# Patient Record
Sex: MALE | Race: BLACK OR AFRICAN AMERICAN | NOT HISPANIC OR LATINO | URBAN - METROPOLITAN AREA
[De-identification: names, ages, dates, MRNs, and addresses within clinical notes are randomized per-mention and may not be internally consistent; named-entity substitution may affect disease eponyms.]

---

## 2017-07-06 ENCOUNTER — OUTPATIENT (OUTPATIENT)
Dept: OUTPATIENT SERVICES | Facility: HOSPITAL | Age: 62
LOS: 1 days | Discharge: HOME | End: 2017-07-06
Payer: COMMERCIAL

## 2017-07-06 DIAGNOSIS — E66.01 MORBID (SEVERE) OBESITY DUE TO EXCESS CALORIES: ICD-10-CM

## 2017-07-06 LAB
ABO RH CONFIRMATION: SIGNIFICANT CHANGE UP
ALBUMIN SERPL ELPH-MCNC: 4.3 G/DL — SIGNIFICANT CHANGE UP (ref 3.3–5)
ANION GAP SERPL CALC-SCNC: 11 MMOL/L — SIGNIFICANT CHANGE UP (ref 5–17)
APPEARANCE UR: (no result)
APTT BLD: 30.3 SEC — SIGNIFICANT CHANGE UP (ref 27.5–37.4)
BACTERIA # UR AUTO: (no result)
BASOPHILS # BLD AUTO: 0.1 K/UL — SIGNIFICANT CHANGE UP (ref 0–0.2)
BASOPHILS NFR BLD AUTO: 2.2 % — HIGH (ref 0–2)
BILIRUB UR-MCNC: NEGATIVE — SIGNIFICANT CHANGE UP
BLD GP AB SCN SERPL QL: SIGNIFICANT CHANGE UP
BLOOD GAS SOURCE: SIGNIFICANT CHANGE UP
BUN SERPL-MCNC: 26 MG/DL — HIGH (ref 7–23)
CALCIUM SERPL-MCNC: 9.7 MG/DL — SIGNIFICANT CHANGE UP (ref 8.5–10.1)
CHLORIDE SERPL-SCNC: 104 MMOL/L — SIGNIFICANT CHANGE UP (ref 96–108)
CO2 SERPL-SCNC: 24 MMOL/L — SIGNIFICANT CHANGE UP (ref 22–31)
COHGB MFR BLDV: 1.6 % — HIGH (ref 0–1.5)
COLOR SPEC: YELLOW — SIGNIFICANT CHANGE UP
CREAT SERPL-MCNC: 2.12 MG/DL — HIGH (ref 0.5–1.3)
DIFF PNL FLD: (no result)
EOSINOPHIL # BLD AUTO: 0.2 K/UL — SIGNIFICANT CHANGE UP (ref 0–0.5)
EOSINOPHIL NFR BLD AUTO: 3.6 % — SIGNIFICANT CHANGE UP (ref 0–6)
EPI CELLS # UR: (no result)
GLUCOSE SERPL-MCNC: 135 MG/DL — HIGH (ref 70–99)
GLUCOSE UR QL: NEGATIVE MG/DL — SIGNIFICANT CHANGE UP
HCT VFR BLD CALC: 43.6 % — SIGNIFICANT CHANGE UP (ref 39–50)
HGB BLD-MCNC: 14.4 G/DL — SIGNIFICANT CHANGE UP (ref 13–17)
HYALINE CASTS # UR AUTO: (no result) /LPF
INR BLD: 1.16 RATIO — SIGNIFICANT CHANGE UP (ref 0.88–1.16)
KETONES UR-MCNC: (no result)
LEUKOCYTE ESTERASE UR-ACNC: (no result)
LYMPHOCYTES # BLD AUTO: 2.6 K/UL — SIGNIFICANT CHANGE UP (ref 1–3.3)
LYMPHOCYTES # BLD AUTO: 44.2 % — HIGH (ref 13–44)
MCHC RBC-ENTMCNC: 26.6 PG — LOW (ref 27–34)
MCHC RBC-ENTMCNC: 33.1 GM/DL — SIGNIFICANT CHANGE UP (ref 32–36)
MCV RBC AUTO: 80.2 FL — SIGNIFICANT CHANGE UP (ref 80–100)
MONOCYTES # BLD AUTO: 0.6 K/UL — SIGNIFICANT CHANGE UP (ref 0–0.9)
MONOCYTES NFR BLD AUTO: 10.1 % — SIGNIFICANT CHANGE UP (ref 2–14)
NEUTROPHILS # BLD AUTO: 2.4 K/UL — SIGNIFICANT CHANGE UP (ref 1.8–7.4)
NEUTROPHILS NFR BLD AUTO: 39.9 % — LOW (ref 43–77)
NITRITE UR-MCNC: NEGATIVE — SIGNIFICANT CHANGE UP
PH UR: 5 — SIGNIFICANT CHANGE UP (ref 5–8)
PLATELET # BLD AUTO: 329 K/UL — SIGNIFICANT CHANGE UP (ref 150–400)
POTASSIUM SERPL-MCNC: 3.7 MMOL/L — SIGNIFICANT CHANGE UP (ref 3.5–5.3)
POTASSIUM SERPL-SCNC: 3.7 MMOL/L — SIGNIFICANT CHANGE UP (ref 3.5–5.3)
PROT UR-MCNC: 30 MG/DL
PROTHROM AB SERPL-ACNC: 12.6 SEC — SIGNIFICANT CHANGE UP (ref 9.8–12.7)
RBC # BLD: 5.44 M/UL — SIGNIFICANT CHANGE UP (ref 4.2–5.8)
RBC # FLD: 13 % — SIGNIFICANT CHANGE UP (ref 10.3–14.5)
RBC CASTS # UR COMP ASSIST: (no result) /HPF (ref 0–4)
SODIUM SERPL-SCNC: 139 MMOL/L — SIGNIFICANT CHANGE UP (ref 135–145)
SP GR SPEC: 1.02 — SIGNIFICANT CHANGE UP (ref 1.01–1.02)
TYPE + AB SCN PNL BLD: SIGNIFICANT CHANGE UP
UROBILINOGEN FLD QL: 1 MG/DL
WBC # BLD: 6 K/UL — SIGNIFICANT CHANGE UP (ref 3.8–10.5)
WBC # FLD AUTO: 6 K/UL — SIGNIFICANT CHANGE UP (ref 3.8–10.5)
WBC UR QL: SIGNIFICANT CHANGE UP

## 2017-07-06 PROCEDURE — 93010 ELECTROCARDIOGRAM REPORT: CPT

## 2017-07-11 RX ORDER — HYDROMORPHONE HYDROCHLORIDE 2 MG/ML
30 INJECTION INTRAMUSCULAR; INTRAVENOUS; SUBCUTANEOUS
Qty: 0 | Refills: 0 | Status: DISCONTINUED | OUTPATIENT
Start: 2017-07-12 | End: 2017-07-13

## 2017-07-11 RX ORDER — HEPARIN SODIUM 5000 [USP'U]/ML
5000 INJECTION INTRAVENOUS; SUBCUTANEOUS ONCE
Qty: 0 | Refills: 0 | Status: COMPLETED | OUTPATIENT
Start: 2017-07-12 | End: 2017-07-12

## 2017-07-11 RX ORDER — ONDANSETRON 8 MG/1
4 TABLET, FILM COATED ORAL EVERY 6 HOURS
Qty: 0 | Refills: 0 | Status: DISCONTINUED | OUTPATIENT
Start: 2017-07-12 | End: 2017-07-13

## 2017-07-11 RX ORDER — OXYCODONE HYDROCHLORIDE 5 MG/1
10 TABLET ORAL ONCE
Qty: 0 | Refills: 0 | Status: DISCONTINUED | OUTPATIENT
Start: 2017-07-12 | End: 2017-07-12

## 2017-07-11 RX ORDER — NALOXONE HYDROCHLORIDE 4 MG/.1ML
0.1 SPRAY NASAL
Qty: 0 | Refills: 0 | Status: DISCONTINUED | OUTPATIENT
Start: 2017-07-12 | End: 2017-07-13

## 2017-07-11 RX ORDER — HYDROMORPHONE HYDROCHLORIDE 2 MG/ML
0.5 INJECTION INTRAMUSCULAR; INTRAVENOUS; SUBCUTANEOUS
Qty: 0 | Refills: 0 | Status: DISCONTINUED | OUTPATIENT
Start: 2017-07-12 | End: 2017-07-13

## 2017-07-11 RX ORDER — SODIUM CHLORIDE 9 MG/ML
1000 INJECTION, SOLUTION INTRAVENOUS
Qty: 0 | Refills: 0 | Status: DISCONTINUED | OUTPATIENT
Start: 2017-07-12 | End: 2017-07-12

## 2017-07-11 RX ORDER — APREPITANT 80 MG/1
40 CAPSULE ORAL ONCE
Qty: 0 | Refills: 0 | Status: COMPLETED | OUTPATIENT
Start: 2017-07-12 | End: 2017-07-12

## 2017-07-12 ENCOUNTER — INPATIENT (INPATIENT)
Facility: HOSPITAL | Age: 62
LOS: 1 days | Discharge: HOME | End: 2017-07-14
Attending: SURGERY | Admitting: SURGERY
Payer: COMMERCIAL

## 2017-07-12 VITALS
HEART RATE: 73 BPM | OXYGEN SATURATION: 100 % | HEIGHT: 73 IN | SYSTOLIC BLOOD PRESSURE: 129 MMHG | RESPIRATION RATE: 16 BRPM | DIASTOLIC BLOOD PRESSURE: 87 MMHG | TEMPERATURE: 98 F | WEIGHT: 238.1 LBS

## 2017-07-12 DIAGNOSIS — M67.00 SHORT ACHILLES TENDON (ACQUIRED), UNSPECIFIED ANKLE: Chronic | ICD-10-CM

## 2017-07-12 LAB
ANION GAP SERPL CALC-SCNC: 13 MMOL/L — SIGNIFICANT CHANGE UP (ref 5–17)
BASOPHILS # BLD AUTO: 0.1 K/UL — SIGNIFICANT CHANGE UP (ref 0–0.2)
BASOPHILS NFR BLD AUTO: 0.5 % — SIGNIFICANT CHANGE UP (ref 0–2)
BUN SERPL-MCNC: 26 MG/DL — HIGH (ref 7–23)
CALCIUM SERPL-MCNC: 9.2 MG/DL — SIGNIFICANT CHANGE UP (ref 8.5–10.1)
CHLORIDE SERPL-SCNC: 105 MMOL/L — SIGNIFICANT CHANGE UP (ref 96–108)
CO2 SERPL-SCNC: 20 MMOL/L — LOW (ref 22–31)
CREAT SERPL-MCNC: 2.59 MG/DL — HIGH (ref 0.5–1.3)
EOSINOPHIL # BLD AUTO: 0 K/UL — SIGNIFICANT CHANGE UP (ref 0–0.5)
EOSINOPHIL NFR BLD AUTO: 0.4 % — SIGNIFICANT CHANGE UP (ref 0–6)
GLUCOSE SERPL-MCNC: 189 MG/DL — HIGH (ref 70–99)
HCT VFR BLD CALC: 40.9 % — SIGNIFICANT CHANGE UP (ref 39–50)
HGB BLD-MCNC: 13.7 G/DL — SIGNIFICANT CHANGE UP (ref 13–17)
LYMPHOCYTES # BLD AUTO: 1.8 K/UL — SIGNIFICANT CHANGE UP (ref 1–3.3)
LYMPHOCYTES # BLD AUTO: 14.9 % — SIGNIFICANT CHANGE UP (ref 13–44)
MCHC RBC-ENTMCNC: 26.7 PG — LOW (ref 27–34)
MCHC RBC-ENTMCNC: 33.5 GM/DL — SIGNIFICANT CHANGE UP (ref 32–36)
MCV RBC AUTO: 79.8 FL — LOW (ref 80–100)
MONOCYTES # BLD AUTO: 0.3 K/UL — SIGNIFICANT CHANGE UP (ref 0–0.9)
MONOCYTES NFR BLD AUTO: 2.5 % — SIGNIFICANT CHANGE UP (ref 2–14)
NEUTROPHILS # BLD AUTO: 9.6 K/UL — HIGH (ref 1.8–7.4)
NEUTROPHILS NFR BLD AUTO: 81.7 % — HIGH (ref 43–77)
PLATELET # BLD AUTO: 289 K/UL — SIGNIFICANT CHANGE UP (ref 150–400)
POTASSIUM SERPL-MCNC: 3.7 MMOL/L — SIGNIFICANT CHANGE UP (ref 3.5–5.3)
POTASSIUM SERPL-SCNC: 3.7 MMOL/L — SIGNIFICANT CHANGE UP (ref 3.5–5.3)
RBC # BLD: 5.13 M/UL — SIGNIFICANT CHANGE UP (ref 4.2–5.8)
RBC # FLD: 12.8 % — SIGNIFICANT CHANGE UP (ref 10.3–14.5)
SODIUM SERPL-SCNC: 138 MMOL/L — SIGNIFICANT CHANGE UP (ref 135–145)
WBC # BLD: 11.8 K/UL — HIGH (ref 3.8–10.5)
WBC # FLD AUTO: 11.8 K/UL — HIGH (ref 3.8–10.5)

## 2017-07-12 PROCEDURE — 88342 IMHCHEM/IMCYTCHM 1ST ANTB: CPT | Mod: 26

## 2017-07-12 PROCEDURE — 88304 TISSUE EXAM BY PATHOLOGIST: CPT | Mod: 26

## 2017-07-12 PROCEDURE — 88313 SPECIAL STAINS GROUP 2: CPT | Mod: 26

## 2017-07-12 PROCEDURE — 88307 TISSUE EXAM BY PATHOLOGIST: CPT | Mod: 26

## 2017-07-12 RX ORDER — HYDRALAZINE HCL 50 MG
10 TABLET ORAL ONCE
Qty: 0 | Refills: 0 | Status: COMPLETED | OUTPATIENT
Start: 2017-07-12 | End: 2017-07-12

## 2017-07-12 RX ORDER — METOPROLOL TARTRATE 50 MG
5 TABLET ORAL ONCE
Qty: 0 | Refills: 0 | Status: COMPLETED | OUTPATIENT
Start: 2017-07-12 | End: 2017-07-12

## 2017-07-12 RX ORDER — DEXTROSE 50 % IN WATER 50 %
25 SYRINGE (ML) INTRAVENOUS ONCE
Qty: 0 | Refills: 0 | Status: DISCONTINUED | OUTPATIENT
Start: 2017-07-12 | End: 2017-07-14

## 2017-07-12 RX ORDER — SODIUM CHLORIDE 9 MG/ML
1000 INJECTION, SOLUTION INTRAVENOUS
Qty: 0 | Refills: 0 | Status: DISCONTINUED | OUTPATIENT
Start: 2017-07-12 | End: 2017-07-14

## 2017-07-12 RX ORDER — DEXTROSE 50 % IN WATER 50 %
12.5 SYRINGE (ML) INTRAVENOUS ONCE
Qty: 0 | Refills: 0 | Status: DISCONTINUED | OUTPATIENT
Start: 2017-07-12 | End: 2017-07-14

## 2017-07-12 RX ORDER — ENOXAPARIN SODIUM 100 MG/ML
40 INJECTION SUBCUTANEOUS EVERY 12 HOURS
Qty: 0 | Refills: 0 | Status: DISCONTINUED | OUTPATIENT
Start: 2017-07-12 | End: 2017-07-14

## 2017-07-12 RX ORDER — PANTOPRAZOLE SODIUM 20 MG/1
40 TABLET, DELAYED RELEASE ORAL EVERY 24 HOURS
Qty: 0 | Refills: 0 | Status: DISCONTINUED | OUTPATIENT
Start: 2017-07-12 | End: 2017-07-14

## 2017-07-12 RX ORDER — INSULIN LISPRO 100/ML
VIAL (ML) SUBCUTANEOUS EVERY 6 HOURS
Qty: 0 | Refills: 0 | Status: DISCONTINUED | OUTPATIENT
Start: 2017-07-12 | End: 2017-07-14

## 2017-07-12 RX ORDER — GLUCAGON INJECTION, SOLUTION 0.5 MG/.1ML
1 INJECTION, SOLUTION SUBCUTANEOUS ONCE
Qty: 0 | Refills: 0 | Status: DISCONTINUED | OUTPATIENT
Start: 2017-07-12 | End: 2017-07-14

## 2017-07-12 RX ADMIN — Medication 10 MILLIGRAM(S): at 15:48

## 2017-07-12 RX ADMIN — OXYCODONE HYDROCHLORIDE 10 MILLIGRAM(S): 5 TABLET ORAL at 10:08

## 2017-07-12 RX ADMIN — Medication 10 MILLIGRAM(S): at 15:26

## 2017-07-12 RX ADMIN — Medication 5 MILLIGRAM(S): at 15:49

## 2017-07-12 RX ADMIN — ENOXAPARIN SODIUM 40 MILLIGRAM(S): 100 INJECTION SUBCUTANEOUS at 18:29

## 2017-07-12 RX ADMIN — Medication 1: at 18:31

## 2017-07-12 RX ADMIN — HYDROMORPHONE HYDROCHLORIDE 30 MILLILITER(S): 2 INJECTION INTRAMUSCULAR; INTRAVENOUS; SUBCUTANEOUS at 15:27

## 2017-07-12 RX ADMIN — HEPARIN SODIUM 5000 UNIT(S): 5000 INJECTION INTRAVENOUS; SUBCUTANEOUS at 10:08

## 2017-07-12 RX ADMIN — APREPITANT 40 MILLIGRAM(S): 80 CAPSULE ORAL at 10:07

## 2017-07-12 RX ADMIN — PANTOPRAZOLE SODIUM 40 MILLIGRAM(S): 20 TABLET, DELAYED RELEASE ORAL at 15:18

## 2017-07-12 RX ADMIN — Medication 1: at 23:32

## 2017-07-12 RX ADMIN — SODIUM CHLORIDE 150 MILLILITER(S): 9 INJECTION, SOLUTION INTRAVENOUS at 21:08

## 2017-07-12 RX ADMIN — SODIUM CHLORIDE 150 MILLILITER(S): 9 INJECTION, SOLUTION INTRAVENOUS at 15:17

## 2017-07-13 DIAGNOSIS — E66.01 MORBID (SEVERE) OBESITY DUE TO EXCESS CALORIES: ICD-10-CM

## 2017-07-13 LAB
ANION GAP SERPL CALC-SCNC: 14 MMOL/L — SIGNIFICANT CHANGE UP (ref 5–17)
BASOPHILS # BLD AUTO: 0 K/UL — SIGNIFICANT CHANGE UP (ref 0–0.2)
BASOPHILS NFR BLD AUTO: 0.4 % — SIGNIFICANT CHANGE UP (ref 0–2)
BUN SERPL-MCNC: 23 MG/DL — SIGNIFICANT CHANGE UP (ref 7–23)
CALCIUM SERPL-MCNC: 9.6 MG/DL — SIGNIFICANT CHANGE UP (ref 8.5–10.1)
CHLORIDE SERPL-SCNC: 103 MMOL/L — SIGNIFICANT CHANGE UP (ref 96–108)
CO2 SERPL-SCNC: 21 MMOL/L — LOW (ref 22–31)
CREAT SERPL-MCNC: 2 MG/DL — HIGH (ref 0.5–1.3)
EOSINOPHIL # BLD AUTO: 0 K/UL — SIGNIFICANT CHANGE UP (ref 0–0.5)
EOSINOPHIL NFR BLD AUTO: 0.1 % — SIGNIFICANT CHANGE UP (ref 0–6)
GLUCOSE SERPL-MCNC: 143 MG/DL — HIGH (ref 70–99)
HCT VFR BLD CALC: 42.4 % — SIGNIFICANT CHANGE UP (ref 39–50)
HGB BLD-MCNC: 14.1 G/DL — SIGNIFICANT CHANGE UP (ref 13–17)
LYMPHOCYTES # BLD AUTO: 1.3 K/UL — SIGNIFICANT CHANGE UP (ref 1–3.3)
LYMPHOCYTES # BLD AUTO: 14 % — SIGNIFICANT CHANGE UP (ref 13–44)
MCHC RBC-ENTMCNC: 26.7 PG — LOW (ref 27–34)
MCHC RBC-ENTMCNC: 33.2 GM/DL — SIGNIFICANT CHANGE UP (ref 32–36)
MCV RBC AUTO: 80.3 FL — SIGNIFICANT CHANGE UP (ref 80–100)
MONOCYTES # BLD AUTO: 0.5 K/UL — SIGNIFICANT CHANGE UP (ref 0–0.9)
MONOCYTES NFR BLD AUTO: 5.9 % — SIGNIFICANT CHANGE UP (ref 2–14)
NEUTROPHILS # BLD AUTO: 7.2 K/UL — SIGNIFICANT CHANGE UP (ref 1.8–7.4)
NEUTROPHILS NFR BLD AUTO: 79.5 % — HIGH (ref 43–77)
PLATELET # BLD AUTO: 308 K/UL — SIGNIFICANT CHANGE UP (ref 150–400)
POTASSIUM SERPL-MCNC: 3.8 MMOL/L — SIGNIFICANT CHANGE UP (ref 3.5–5.3)
POTASSIUM SERPL-SCNC: 3.8 MMOL/L — SIGNIFICANT CHANGE UP (ref 3.5–5.3)
RBC # BLD: 5.27 M/UL — SIGNIFICANT CHANGE UP (ref 4.2–5.8)
RBC # FLD: 13.4 % — SIGNIFICANT CHANGE UP (ref 10.3–14.5)
SODIUM SERPL-SCNC: 138 MMOL/L — SIGNIFICANT CHANGE UP (ref 135–145)
WBC # BLD: 9 K/UL — SIGNIFICANT CHANGE UP (ref 3.8–10.5)
WBC # FLD AUTO: 9 K/UL — SIGNIFICANT CHANGE UP (ref 3.8–10.5)

## 2017-07-13 PROCEDURE — 74241: CPT | Mod: 26

## 2017-07-13 RX ORDER — OXYCODONE HYDROCHLORIDE 5 MG/1
5 TABLET ORAL EVERY 4 HOURS
Qty: 0 | Refills: 0 | Status: DISCONTINUED | OUTPATIENT
Start: 2017-07-13 | End: 2017-07-14

## 2017-07-13 RX ORDER — KETOROLAC TROMETHAMINE 30 MG/ML
30 SYRINGE (ML) INJECTION EVERY 6 HOURS
Qty: 0 | Refills: 0 | Status: DISCONTINUED | OUTPATIENT
Start: 2017-07-13 | End: 2017-07-13

## 2017-07-13 RX ORDER — OXYCODONE HYDROCHLORIDE 5 MG/1
10 TABLET ORAL EVERY 4 HOURS
Qty: 0 | Refills: 0 | Status: DISCONTINUED | OUTPATIENT
Start: 2017-07-13 | End: 2017-07-14

## 2017-07-13 RX ADMIN — SODIUM CHLORIDE 150 MILLILITER(S): 9 INJECTION, SOLUTION INTRAVENOUS at 06:01

## 2017-07-13 RX ADMIN — Medication 30 MILLIGRAM(S): at 12:51

## 2017-07-13 RX ADMIN — ENOXAPARIN SODIUM 40 MILLIGRAM(S): 100 INJECTION SUBCUTANEOUS at 17:36

## 2017-07-13 RX ADMIN — Medication 2.5 MILLIGRAM(S): at 17:35

## 2017-07-13 RX ADMIN — Medication 2.5 MILLIGRAM(S): at 23:53

## 2017-07-13 RX ADMIN — Medication 2.5 MILLIGRAM(S): at 12:06

## 2017-07-13 RX ADMIN — PANTOPRAZOLE SODIUM 40 MILLIGRAM(S): 20 TABLET, DELAYED RELEASE ORAL at 17:35

## 2017-07-13 RX ADMIN — Medication 2.5 MILLIGRAM(S): at 00:50

## 2017-07-13 RX ADMIN — ENOXAPARIN SODIUM 40 MILLIGRAM(S): 100 INJECTION SUBCUTANEOUS at 05:57

## 2017-07-13 RX ADMIN — Medication 2.5 MILLIGRAM(S): at 05:57

## 2017-07-13 RX ADMIN — SODIUM CHLORIDE 150 MILLILITER(S): 9 INJECTION, SOLUTION INTRAVENOUS at 23:53

## 2017-07-13 RX ADMIN — Medication: at 12:06

## 2017-07-13 RX ADMIN — SODIUM CHLORIDE 150 MILLILITER(S): 9 INJECTION, SOLUTION INTRAVENOUS at 17:35

## 2017-07-13 NOTE — PROGRESS NOTE ADULT - SUBJECTIVE AND OBJECTIVE BOX
Post Op Day#: 1  Procedure:  Laparoscopic Sleeve Gastrectomy    The patient is doing well without complaints.    Vital Signs Last 24 Hrs  T(C): 36.4 (13 Jul 2017 11:17), Max: 37.1 (12 Jul 2017 16:30)  T(F): 97.5 (13 Jul 2017 11:17), Max: 98.8 (12 Jul 2017 16:30)  HR: 73 (13 Jul 2017 11:17) (73 - 93)  BP: 159/88 (13 Jul 2017 11:17) (145/72 - 174/79)  BP(mean): --  RR: 18 (13 Jul 2017 11:17) (17 - 26)  SpO2: 100% (13 Jul 2017 11:17) (98% - 100%)    PHYSICAL EXAM:  General: NAD.  HEENT: no JVD, no jaundice.  LUNGS: CTAB.  Heart: S1 S2 RRR  Abd: soft nt/nd   Wounds: clean dry and intact                          14.1   9.0   )-----------( 308      ( 13 Jul 2017 08:01 )             42.4       07-13    138  |  103  |  23  ----------------------------<  143<H>  3.8   |  21<L>  |  2.00<H>    Ca    9.6      13 Jul 2017 08:01

## 2017-07-14 VITALS
SYSTOLIC BLOOD PRESSURE: 153 MMHG | RESPIRATION RATE: 18 BRPM | TEMPERATURE: 98 F | DIASTOLIC BLOOD PRESSURE: 83 MMHG | OXYGEN SATURATION: 100 % | HEART RATE: 57 BPM

## 2017-07-14 DIAGNOSIS — N28.9 DISORDER OF KIDNEY AND URETER, UNSPECIFIED: ICD-10-CM

## 2017-07-14 LAB
ANION GAP SERPL CALC-SCNC: 9 MMOL/L — SIGNIFICANT CHANGE UP (ref 5–17)
BUN SERPL-MCNC: 21 MG/DL — SIGNIFICANT CHANGE UP (ref 7–23)
CALCIUM SERPL-MCNC: 9.2 MG/DL — SIGNIFICANT CHANGE UP (ref 8.5–10.1)
CHLORIDE SERPL-SCNC: 104 MMOL/L — SIGNIFICANT CHANGE UP (ref 96–108)
CO2 SERPL-SCNC: 24 MMOL/L — SIGNIFICANT CHANGE UP (ref 22–31)
CREAT SERPL-MCNC: 1.39 MG/DL — HIGH (ref 0.5–1.3)
GLUCOSE SERPL-MCNC: 104 MG/DL — HIGH (ref 70–99)
HCT VFR BLD CALC: 39.9 % — SIGNIFICANT CHANGE UP (ref 39–50)
HGB BLD-MCNC: 13.4 G/DL — SIGNIFICANT CHANGE UP (ref 13–17)
MCHC RBC-ENTMCNC: 27 PG — SIGNIFICANT CHANGE UP (ref 27–34)
MCHC RBC-ENTMCNC: 33.8 GM/DL — SIGNIFICANT CHANGE UP (ref 32–36)
MCV RBC AUTO: 79.9 FL — LOW (ref 80–100)
PLATELET # BLD AUTO: 280 K/UL — SIGNIFICANT CHANGE UP (ref 150–400)
POTASSIUM SERPL-MCNC: 3.6 MMOL/L — SIGNIFICANT CHANGE UP (ref 3.5–5.3)
POTASSIUM SERPL-SCNC: 3.6 MMOL/L — SIGNIFICANT CHANGE UP (ref 3.5–5.3)
RBC # BLD: 4.99 M/UL — SIGNIFICANT CHANGE UP (ref 4.2–5.8)
RBC # FLD: 13.7 % — SIGNIFICANT CHANGE UP (ref 10.3–14.5)
SODIUM SERPL-SCNC: 137 MMOL/L — SIGNIFICANT CHANGE UP (ref 135–145)
WBC # BLD: 6.5 K/UL — SIGNIFICANT CHANGE UP (ref 3.8–10.5)
WBC # FLD AUTO: 6.5 K/UL — SIGNIFICANT CHANGE UP (ref 3.8–10.5)

## 2017-07-14 RX ORDER — ACETAMINOPHEN 500 MG
1000 TABLET ORAL ONCE
Qty: 0 | Refills: 0 | Status: COMPLETED | OUTPATIENT
Start: 2017-07-14 | End: 2017-07-14

## 2017-07-14 RX ADMIN — OXYCODONE HYDROCHLORIDE 10 MILLIGRAM(S): 5 TABLET ORAL at 11:51

## 2017-07-14 RX ADMIN — OXYCODONE HYDROCHLORIDE 10 MILLIGRAM(S): 5 TABLET ORAL at 08:22

## 2017-07-14 RX ADMIN — Medication 400 MILLIGRAM(S): at 12:54

## 2017-07-14 RX ADMIN — Medication 2.5 MILLIGRAM(S): at 05:33

## 2017-07-14 RX ADMIN — ENOXAPARIN SODIUM 40 MILLIGRAM(S): 100 INJECTION SUBCUTANEOUS at 05:33

## 2017-07-14 RX ADMIN — OXYCODONE HYDROCHLORIDE 10 MILLIGRAM(S): 5 TABLET ORAL at 08:30

## 2017-07-14 RX ADMIN — Medication 1000 MILLIGRAM(S): at 14:42

## 2017-07-14 RX ADMIN — SODIUM CHLORIDE 150 MILLILITER(S): 9 INJECTION, SOLUTION INTRAVENOUS at 05:33

## 2017-07-14 RX ADMIN — OXYCODONE HYDROCHLORIDE 10 MILLIGRAM(S): 5 TABLET ORAL at 12:20

## 2017-07-14 RX ADMIN — Medication 2.5 MILLIGRAM(S): at 11:52

## 2017-07-14 NOTE — DISCHARGE NOTE ADULT - PATIENT PORTAL LINK FT
“You can access the [employer] Patient Portal, offered by [facility], by registering with the following website: [email or web address]”

## 2017-07-14 NOTE — PROGRESS NOTE ADULT - PROBLEM SELECTOR PLAN 1
The patient is status post laparoscopic sleeve gastrectomy and doing very well.  The patient had an upper G.I. series this morning which showed that she had no leak or stricture.  1.  Start gastric bypass clears.  2.  Ambulation.  3.  Pain control.  4.  Incentive spirometer.
The patient is s/p lap sleeve gastrectomy and doing very well.  All discharge instructions were given to the patient, as well as potential signs of complications.  The patient will follow up in 2 weeks.  DC Home if ok with nephrology

## 2017-07-14 NOTE — PROGRESS NOTE ADULT - SUBJECTIVE AND OBJECTIVE BOX
NEPHROLOGY CONSULT  HPI:    Pt w h/o Morbid obesity, HTN, DM s/p Gastric sleeve which went smoothly..  Gastrograffin negative..  Creat was 2.59 yesterday, down to 2 mg/dl today..  as per Dr coleman office creat 1 mg/dl back in December..  Pre surgical labs on July 6 show creat of 2.12 mg/dl..  Meds pain meds reviewed..  only got 1 dose Toradol..  Pt feels well, no complaints      PAST MEDICAL & SURGICAL HISTORY:  Diabetes  Hyperlipidemia  Sleep apnea  Hypertension  Achilles tendon contracture      FAMILY HISTORY:      MEDICATIONS  (STANDING):  enalaprilat Injectable 2.5 milliGRAM(s) IV Push every 6 hours  lactated ringers. 1000 milliLiter(s) (150 mL/Hr) IV Continuous <Continuous>  enoxaparin Injectable 40 milliGRAM(s) SubCutaneous every 12 hours  pantoprazole  Injectable 40 milliGRAM(s) IV Push every 24 hours  insulin lispro (HumaLOG) corrective regimen sliding scale   SubCutaneous every 6 hours  dextrose 5%. 1000 milliLiter(s) (50 mL/Hr) IV Continuous <Continuous>  dextrose 50% Injectable 12.5 Gram(s) IV Push once  dextrose 50% Injectable 25 Gram(s) IV Push once  dextrose 50% Injectable 25 Gram(s) IV Push once  acetaminophen  IVPB. 1000 milliGRAM(s) IV Intermittent once    MEDICATIONS  (PRN):  glucagon  Injectable 1 milliGRAM(s) IntraMuscular once PRN Glucose LESS THAN 70 milligrams/deciliter  oxyCODONE    Solution 10 milliGRAM(s) Oral every 4 hours PRN Severe Pain (7 - 10)  oxyCODONE    Solution 5 milliGRAM(s) Oral every 4 hours PRN Moderate Pain (4 - 6)      Allergies    Allergy Status Unknown    Intolerances        I&O's Summary    13 Jul 2017 07:01  -  14 Jul 2017 07:00  --------------------------------------------------------  IN: 1830 mL / OUT: 1000 mL / NET: 830 mL    14 Jul 2017 07:01  -  14 Jul 2017 12:50  --------------------------------------------------------  IN: 120 mL / OUT: 0 mL / NET: 120 mL          REVIEW OF SYSTEMS:    CONSTITUTIONAL:  As per HPI.  CONSTITUTIONAL: No weakness, fevers or chills  EYES/ENT: No visual changes;  No vertigo or throat pain   NECK: No pain or stiffness  CARDIOVASCULAR: No chest pain or palpitations  GASTROINTESTINAL: No abdominal or epigastric pain. No nausea, vomiting, or hematemesis; No diarrhea or constipation. No melena or hematochezia.  GENITOURINARY: No dysuria, frequency or hematuria  NEUROLOGICAL: No numbness or weakness  SKIN: No itching, burning, rashes, or lesions   All other review of systems is negative unless indicated above      Vital Signs Last 24 Hrs  T(C): 36.7 (14 Jul 2017 11:43), Max: 36.9 (13 Jul 2017 15:34)  T(F): 98.1 (14 Jul 2017 11:43), Max: 98.5 (13 Jul 2017 15:34)  HR: 57 (14 Jul 2017 11:43) (54 - 60)  BP: 153/83 (14 Jul 2017 11:43) (131/60 - 187/79)  BP(mean): --  RR: 18 (14 Jul 2017 11:43) (17 - 18)  SpO2: 100% (14 Jul 2017 11:43) (100% - 100%)  Daily     Daily   I&O's Summary    13 Jul 2017 07:01  -  14 Jul 2017 07:00  --------------------------------------------------------  IN: 1830 mL / OUT: 1000 mL / NET: 830 mL    14 Jul 2017 07:01  -  14 Jul 2017 12:50  --------------------------------------------------------  IN: 120 mL / OUT: 0 mL / NET: 120 mL        PHYSICAL EXAM:    General:  Alert, well-developed ,No acute distress.    Neuro:  Alert and oriented to person, place, and time. Able to communicate  well. Cranial nerves 2-12 grossly intact. 5/5 strength in all  extremities bilaterally. Sensation intact in all extremities.  Appropriate affect.     HEENT:  No JVD, no masses, Eyes anicteric, No carotid bruits.No lymphadenopathy,    Cardiovascular:  Regular rate and rhythm, with normal S1 and S2. No murmurs, rubs,  or gallops. No JVD.     Lungs:  clear. no rales, no wheezing, .    Abdomen:  Normoactive bowel sounds. Soft, flat, non-tender, and non-distended.  No hepatosplenomegaly, positive bowel sounds, obese    Skin:  Warm, dry, well-perfused. No rashes or other lesions.     Extremities:  2+ pulses in upper and lower extremities. No lower extremity pain or  edema; legs are symmetric in appearance.    LABS:                          13.4   6.5   )-----------( 280      ( 14 Jul 2017 12:09 )             39.9     07-13    138  |  103  |  23  ----------------------------<  143<H>  3.8   |  21<L>  |  2.00<H>    Ca    9.6      13 Jul 2017 08:01    07-13    138  |  103  |  23  ----------------------------<  143<H>  3.8   |  21<L>  |  2.00<H>    Ca    9.6      13 Jul 2017 08:01

## 2017-07-14 NOTE — DISCHARGE NOTE ADULT - MEDICATION SUMMARY - MEDICATIONS TO STOP TAKING
I will STOP taking the medications listed below when I get home from the hospital:    metFORMIN 850 mg oral tablet  -- 1 tab(s) by mouth once a day (in the morning)

## 2017-07-14 NOTE — DISCHARGE NOTE ADULT - CARE PLAN
Principal Discharge DX:	Morbid obesity due to excess calories  Goal:	recover from surgery  Instructions for follow-up, activity and diet:	follow up in 2 weeks, follow the office packet

## 2017-07-14 NOTE — PROGRESS NOTE ADULT - SUBJECTIVE AND OBJECTIVE BOX
Post Op Day#: 2  Procedure:  Laparoscopic Sleeve Gastrectomy    The patient is doing well without complaints.    Vital Signs Last 24 Hrs  T(C): 36.9 (14 Jul 2017 04:01), Max: 36.9 (13 Jul 2017 15:34)  T(F): 98.5 (14 Jul 2017 04:01), Max: 98.5 (13 Jul 2017 15:34)  HR: 60 (14 Jul 2017 04:01) (54 - 73)  BP: 145/71 (14 Jul 2017 04:01) (131/60 - 187/79)  BP(mean): --  RR: 17 (14 Jul 2017 04:01) (17 - 18)  SpO2: 100% (14 Jul 2017 04:01) (100% - 100%)    PHYSICAL EXAM:  General: NAD.  HEENT: no JVD, no jaundice.  LUNGS: CTAB.  Heart: S1 S2 RRR  Abd: soft nt/nd   Wounds: clean dry and intact                          14.1   9.0   )-----------( 308      ( 13 Jul 2017 08:01 )             42.4       07-13    138  |  103  |  23  ----------------------------<  143<H>  3.8   |  21<L>  |  2.00<H>    Ca    9.6      13 Jul 2017 08:01

## 2017-07-14 NOTE — DISCHARGE NOTE ADULT - HOSPITAL COURSE
s/p lap sleeve gastrectomy, upper gi series negative, fredy clears, found to have some renal insuff nephrology consulted

## 2017-07-14 NOTE — PROGRESS NOTE ADULT - ASSESSMENT
Pt w h/o Morbid obesity, HTN, DM s/p Gastric sleeve which went smoothly..  Gastrograffin negative..  Creat was 2.59 yesterday, down to 2 mg/dl today..  as per Dr coleman office creat 1 mg/dl back in December..  Pre surgical labs on July 6 show creat of 2.12 mg/dl..  Meds pain meds reviewed..  only got 1 dose Toradol..  Pt feels well, no complaints    a/p  s/p Sleeve procedure  Creat was 2 prior to surgery  and 1.27 on 8/2015..  Will get labs as outpt on monday and stat labs today..

## 2017-07-17 LAB — SURGICAL PATHOLOGY FINAL REPORT - CH: SIGNIFICANT CHANGE UP

## 2017-07-18 DIAGNOSIS — N28.9 DISORDER OF KIDNEY AND URETER, UNSPECIFIED: ICD-10-CM

## 2017-07-18 DIAGNOSIS — E66.01 MORBID (SEVERE) OBESITY DUE TO EXCESS CALORIES: ICD-10-CM

## 2017-07-18 DIAGNOSIS — E11.9 TYPE 2 DIABETES MELLITUS WITHOUT COMPLICATIONS: ICD-10-CM

## 2017-07-18 DIAGNOSIS — I10 ESSENTIAL (PRIMARY) HYPERTENSION: ICD-10-CM

## 2017-09-07 RX ORDER — METFORMIN HYDROCHLORIDE 850 MG/1
1 TABLET ORAL
Qty: 0 | Refills: 0 | COMMUNITY

## 2018-03-21 PROBLEM — Z00.00 ENCOUNTER FOR PREVENTIVE HEALTH EXAMINATION: Status: ACTIVE | Noted: 2018-03-21

## 2018-04-18 ENCOUNTER — APPOINTMENT (OUTPATIENT)
Dept: ORTHOPEDIC SURGERY | Facility: CLINIC | Age: 63
End: 2018-04-18
Payer: COMMERCIAL

## 2018-04-18 VITALS
HEART RATE: 57 BPM | WEIGHT: 188 LBS | SYSTOLIC BLOOD PRESSURE: 168 MMHG | DIASTOLIC BLOOD PRESSURE: 99 MMHG | HEIGHT: 73 IN | BODY MASS INDEX: 24.92 KG/M2

## 2018-04-18 DIAGNOSIS — M25.562 PAIN IN RIGHT KNEE: ICD-10-CM

## 2018-04-18 DIAGNOSIS — G89.29 PAIN IN RIGHT KNEE: ICD-10-CM

## 2018-04-18 DIAGNOSIS — M17.12 UNILATERAL PRIMARY OSTEOARTHRITIS, LEFT KNEE: ICD-10-CM

## 2018-04-18 DIAGNOSIS — M25.561 PAIN IN RIGHT KNEE: ICD-10-CM

## 2018-04-18 DIAGNOSIS — M17.11 UNILATERAL PRIMARY OSTEOARTHRITIS, RIGHT KNEE: ICD-10-CM

## 2018-04-18 PROCEDURE — 99214 OFFICE O/P EST MOD 30 MIN: CPT

## 2018-04-18 PROCEDURE — 73564 X-RAY EXAM KNEE 4 OR MORE: CPT | Mod: LT

## 2018-08-30 ENCOUNTER — EMERGENCY (EMERGENCY)
Facility: HOSPITAL | Age: 63
LOS: 1 days | Discharge: HOME | End: 2018-08-30
Attending: EMERGENCY MEDICINE | Admitting: EMERGENCY MEDICINE
Payer: COMMERCIAL

## 2018-08-30 VITALS
SYSTOLIC BLOOD PRESSURE: 186 MMHG | DIASTOLIC BLOOD PRESSURE: 104 MMHG | HEART RATE: 110 BPM | OXYGEN SATURATION: 100 % | TEMPERATURE: 99 F | RESPIRATION RATE: 22 BRPM

## 2018-08-30 DIAGNOSIS — M67.00 SHORT ACHILLES TENDON (ACQUIRED), UNSPECIFIED ANKLE: Chronic | ICD-10-CM

## 2018-08-30 PROCEDURE — 99283 EMERGENCY DEPT VISIT LOW MDM: CPT | Mod: 25

## 2018-08-30 PROCEDURE — 30903 CONTROL OF NOSEBLEED: CPT | Mod: LT

## 2018-08-30 RX ORDER — OXYCODONE AND ACETAMINOPHEN 5; 325 MG/1; MG/1
1 TABLET ORAL ONCE
Qty: 0 | Refills: 0 | Status: DISCONTINUED | OUTPATIENT
Start: 2018-08-30 | End: 2018-08-30

## 2018-08-30 RX ADMIN — Medication 1 TABLET(S): at 11:11

## 2018-08-30 RX ADMIN — OXYCODONE AND ACETAMINOPHEN 1 TABLET(S): 5; 325 TABLET ORAL at 11:12

## 2018-08-30 NOTE — ED PROVIDER NOTE - OBJECTIVE STATEMENT
62y M with PMHx HTN and DM presents to the ED for epistaxis starting last night at 11PM. Epistaxis is intermittent and pt went to see PMD but started to feel lightheaded prompting for ED visit. Not on blood thinners 62y M with PMHx HTN and DM presents to the ED for epistaxis starting last night at 11PM. Epistaxis is intermittent and pt went to see PMD but started to feel lightheaded prompting for ED visit. Not on blood thinners No trauma. never had this before

## 2018-08-30 NOTE — ED PROVIDER NOTE - MEDICAL DECISION MAKING DETAILS
Pt presenting with spontaneous epistaxis intermittently since last night. No active bleeding. Will treat with afrin, compression, and reassess

## 2018-08-30 NOTE — ED ADULT TRIAGE NOTE - CHIEF COMPLAINT QUOTE
Pt was headed to MD office for nose bleed.  At MD office he began feeling lightheaded and 911 called.  PMH HTN.  Not on anticoagulants

## 2018-08-30 NOTE — ED PROVIDER NOTE - PROGRESS NOTE DETAILS
AJM. unable to stop bleeding with afrin and compression. rhino rocket placed with improvement. dc with ent followup AJM. unable to stop bleeding with afrin and compression. rhino rocket placed with improvement. dc with ent followup and augmentin. AJM: pt angry on dc. claiming "you did nothing for me, why am I bleeding?" Explained to patient cause of epistaxis is often not known, but bleeding has been controlled. the plan is to go home and have ENT follow up tomorrow. pt frustrated and angry on dc

## 2018-08-31 ENCOUNTER — APPOINTMENT (OUTPATIENT)
Dept: OTOLARYNGOLOGY | Facility: CLINIC | Age: 63
End: 2018-08-31
Payer: COMMERCIAL

## 2018-08-31 ENCOUNTER — EMERGENCY (EMERGENCY)
Facility: HOSPITAL | Age: 63
LOS: 1 days | Discharge: HOME | End: 2018-08-31
Attending: EMERGENCY MEDICINE | Admitting: EMERGENCY MEDICINE
Payer: COMMERCIAL

## 2018-08-31 VITALS
TEMPERATURE: 99 F | DIASTOLIC BLOOD PRESSURE: 91 MMHG | SYSTOLIC BLOOD PRESSURE: 218 MMHG | HEART RATE: 71 BPM | OXYGEN SATURATION: 100 % | RESPIRATION RATE: 18 BRPM

## 2018-08-31 VITALS
HEIGHT: 73 IN | DIASTOLIC BLOOD PRESSURE: 123 MMHG | BODY MASS INDEX: 24.78 KG/M2 | WEIGHT: 187 LBS | HEART RATE: 82 BPM | SYSTOLIC BLOOD PRESSURE: 193 MMHG

## 2018-08-31 VITALS — DIASTOLIC BLOOD PRESSURE: 85 MMHG | HEART RATE: 74 BPM | SYSTOLIC BLOOD PRESSURE: 163 MMHG

## 2018-08-31 DIAGNOSIS — M67.00 SHORT ACHILLES TENDON (ACQUIRED), UNSPECIFIED ANKLE: Chronic | ICD-10-CM

## 2018-08-31 DIAGNOSIS — J34.2 DEVIATED NASAL SEPTUM: ICD-10-CM

## 2018-08-31 DIAGNOSIS — Z78.9 OTHER SPECIFIED HEALTH STATUS: ICD-10-CM

## 2018-08-31 LAB
APTT BLD: 30.6 SEC — SIGNIFICANT CHANGE UP (ref 27.5–37.4)
BASOPHILS # BLD AUTO: 0.02 K/UL — SIGNIFICANT CHANGE UP (ref 0–0.2)
BASOPHILS NFR BLD AUTO: 0.4 % — SIGNIFICANT CHANGE UP (ref 0–2)
BUN SERPL-MCNC: 13 MG/DL — SIGNIFICANT CHANGE UP (ref 7–23)
CALCIUM SERPL-MCNC: 9.6 MG/DL — SIGNIFICANT CHANGE UP (ref 8.4–10.5)
CHLORIDE SERPL-SCNC: 101 MMOL/L — SIGNIFICANT CHANGE UP (ref 98–107)
CO2 SERPL-SCNC: 25 MMOL/L — SIGNIFICANT CHANGE UP (ref 22–31)
CREAT SERPL-MCNC: 1.17 MG/DL — SIGNIFICANT CHANGE UP (ref 0.5–1.3)
EOSINOPHIL # BLD AUTO: 0.07 K/UL — SIGNIFICANT CHANGE UP (ref 0–0.5)
EOSINOPHIL NFR BLD AUTO: 1.3 % — SIGNIFICANT CHANGE UP (ref 0–6)
GLUCOSE SERPL-MCNC: 112 MG/DL — HIGH (ref 70–99)
HCT VFR BLD CALC: 36.4 % — LOW (ref 39–50)
HGB BLD-MCNC: 12 G/DL — LOW (ref 13–17)
IMM GRANULOCYTES # BLD AUTO: 0.01 # — SIGNIFICANT CHANGE UP
IMM GRANULOCYTES NFR BLD AUTO: 0.2 % — SIGNIFICANT CHANGE UP (ref 0–1.5)
INR BLD: 1.08 — SIGNIFICANT CHANGE UP (ref 0.88–1.17)
LYMPHOCYTES # BLD AUTO: 1.84 K/UL — SIGNIFICANT CHANGE UP (ref 1–3.3)
LYMPHOCYTES # BLD AUTO: 33.4 % — SIGNIFICANT CHANGE UP (ref 13–44)
MCHC RBC-ENTMCNC: 27.9 PG — SIGNIFICANT CHANGE UP (ref 27–34)
MCHC RBC-ENTMCNC: 33 % — SIGNIFICANT CHANGE UP (ref 32–36)
MCV RBC AUTO: 84.7 FL — SIGNIFICANT CHANGE UP (ref 80–100)
MONOCYTES # BLD AUTO: 0.48 K/UL — SIGNIFICANT CHANGE UP (ref 0–0.9)
MONOCYTES NFR BLD AUTO: 8.7 % — SIGNIFICANT CHANGE UP (ref 2–14)
NEUTROPHILS # BLD AUTO: 3.09 K/UL — SIGNIFICANT CHANGE UP (ref 1.8–7.4)
NEUTROPHILS NFR BLD AUTO: 56 % — SIGNIFICANT CHANGE UP (ref 43–77)
NRBC # FLD: 0 — SIGNIFICANT CHANGE UP
PLATELET # BLD AUTO: 196 K/UL — SIGNIFICANT CHANGE UP (ref 150–400)
PMV BLD: 10.1 FL — SIGNIFICANT CHANGE UP (ref 7–13)
POTASSIUM SERPL-MCNC: 5.3 MMOL/L — SIGNIFICANT CHANGE UP (ref 3.5–5.3)
POTASSIUM SERPL-SCNC: 5.3 MMOL/L — SIGNIFICANT CHANGE UP (ref 3.5–5.3)
PROTHROM AB SERPL-ACNC: 12.4 SEC — SIGNIFICANT CHANGE UP (ref 9.8–13.1)
RBC # BLD: 4.3 M/UL — SIGNIFICANT CHANGE UP (ref 4.2–5.8)
RBC # FLD: 13.9 % — SIGNIFICANT CHANGE UP (ref 10.3–14.5)
SODIUM SERPL-SCNC: 142 MMOL/L — SIGNIFICANT CHANGE UP (ref 135–145)
TROPONIN T, HIGH SENSITIVITY: 17 NG/L — SIGNIFICANT CHANGE UP (ref ?–14)
TROPONIN T, HIGH SENSITIVITY: 18 NG/L — SIGNIFICANT CHANGE UP (ref ?–14)
WBC # BLD: 5.51 K/UL — SIGNIFICANT CHANGE UP (ref 3.8–10.5)
WBC # FLD AUTO: 5.51 K/UL — SIGNIFICANT CHANGE UP (ref 3.8–10.5)

## 2018-08-31 PROCEDURE — 99204 OFFICE O/P NEW MOD 45 MIN: CPT

## 2018-08-31 PROCEDURE — 99284 EMERGENCY DEPT VISIT MOD MDM: CPT | Mod: 25

## 2018-08-31 PROCEDURE — 93010 ELECTROCARDIOGRAM REPORT: CPT

## 2018-08-31 RX ORDER — AMLODIPINE BESYLATE 2.5 MG/1
1 TABLET ORAL
Qty: 14 | Refills: 0
Start: 2018-08-31 | End: 2018-09-13

## 2018-08-31 RX ORDER — OXYCODONE AND ACETAMINOPHEN 5; 325 MG/1; MG/1
1 TABLET ORAL ONCE
Qty: 0 | Refills: 0 | Status: DISCONTINUED | OUTPATIENT
Start: 2018-08-31 | End: 2018-08-31

## 2018-08-31 RX ORDER — AMLODIPINE BESYLATE 2.5 MG/1
10 TABLET ORAL ONCE
Qty: 0 | Refills: 0 | Status: COMPLETED | OUTPATIENT
Start: 2018-08-31 | End: 2018-08-31

## 2018-08-31 RX ORDER — LABETALOL HCL 100 MG
10 TABLET ORAL ONCE
Qty: 0 | Refills: 0 | Status: COMPLETED | OUTPATIENT
Start: 2018-08-31 | End: 2018-08-31

## 2018-08-31 RX ORDER — HYDRALAZINE HCL 50 MG
10 TABLET ORAL ONCE
Qty: 0 | Refills: 0 | Status: COMPLETED | OUTPATIENT
Start: 2018-08-31 | End: 2018-08-31

## 2018-08-31 RX ORDER — AMLODIPINE BESYLATE 2.5 MG/1
1 TABLET ORAL
Qty: 0 | Refills: 0 | DISCHARGE
Start: 2018-08-31 | End: 2018-09-13

## 2018-08-31 RX ADMIN — AMLODIPINE BESYLATE 10 MILLIGRAM(S): 2.5 TABLET ORAL at 12:01

## 2018-08-31 RX ADMIN — OXYCODONE AND ACETAMINOPHEN 1 TABLET(S): 5; 325 TABLET ORAL at 12:01

## 2018-08-31 RX ADMIN — Medication 10 MILLIGRAM(S): at 12:57

## 2018-08-31 RX ADMIN — Medication 10 MILLIGRAM(S): at 12:01

## 2018-08-31 NOTE — ED PROVIDER NOTE - MEDICAL DECISION MAKING DETAILS
62y male presenting with HTN, chest tightness, recent epistaxis, nasal packing in place. Concern for HTN emergency, check for end organ damage, ACS.  Labs, troponin, ekg.  Treat BP, reassess, consult ENT for packing removal.

## 2018-08-31 NOTE — ED PROVIDER NOTE - PROGRESS NOTE DETAILS
Carlo Powell, PGY-1 EM: patient reports feeling better.  Systolic 198, will give hydralazine 10mg. Carlo Powell, PGY-1 EM: patients wife called for up date, patient gave verbal approval to give out information over the phone. Carlo Powell, PGY-1 EM: spoke with ENT, said packing needs to stay in for 48-72 hours, said for patient to return to the ED for packing removal because of the weekend. Carlo Powell, PGY-1 EM: spoke with patient let him know of plan to start on BP medication and percocet for pain control until packing can be removed in the ED tomorrow.

## 2018-08-31 NOTE — ED ADULT NURSE REASSESSMENT NOTE - NS ED NURSE REASSESS COMMENT FT1
Patient cleared for discharge by provider.  D/C instructions and heplock removed by provider. Eve RICE

## 2018-08-31 NOTE — ED ADULT NURSE NOTE - CHIEF COMPLAINT QUOTE
Brought in by ems from ENT office. Was seen yesterday in ED for epistaxis from left nostril. Was at ENT office to have packing removed, however BP was noted to be 218/91. Packing left in placed and sent to ED for eval. Co 10/10 headache. thin

## 2018-08-31 NOTE — ED PROVIDER NOTE - PMH
Diabetes    DM (diabetes mellitus)    HTN (hypertension)    Hyperlipidemia    Hypertension    Sleep apnea

## 2018-08-31 NOTE — ED PROVIDER NOTE - OBJECTIVE STATEMENT
62y male with PMH of gastric sleeve presenting with hypertension.  Patient was at ENT office to have packing removed from left nostril that was placed yesterday in the [facility] ED.  In the ENT office he was found to be hypertensive and sent to the ED.  Patient is reporting that he has a headache, left sided facial pain from the packing, LE weakness and chest tightness.  BP in the room was 233/114.  Denies n/v, abdominal pain, chest pain, neck pain, back pain, syncope, dizziness.  No history of heart disease or cva, no family history of heart disease or cva.  Reports increased stress at work.  Not currently on medication for HTN, had been in the past, but since he lost weight with the gastric sleeve, his BP had been normal so he stopped taking medication, also states that he was no longer prediabetic after losing weight.

## 2018-08-31 NOTE — ED ADULT TRIAGE NOTE - CHIEF COMPLAINT QUOTE
Brought in by ems from ENT office. Was seen yesterday in ED for epistaxis from left nostril. Was at ENT office to have packing removed, however BP was noted to be 218/91. Packing left in placed and sent to ED for eval. Co 10/10 headache.

## 2018-08-31 NOTE — ED PROVIDER NOTE - ATTENDING CONTRIBUTION TO CARE
Dr. Benitez: I have personally performed a face to face bedside history and physical examination of this patient. I have discussed the history, examination, review of systems, assessment and plan of management with the resident. I have reviewed the electronic medical record and amended it to reflect my history, review of systems, physical exam, assessment and plan.    62M with pmh of HTN (off meds) s/p nasal packing yesterday sent from ENT office for elevated BP. Pt with vague symptoms of chest tightness and generalized weakness. Reports discomfort to left side of face where packing is at, radiating to left head. No vomiting, sob, neuro symptoms. Not on AC.    On exam well appearing, packing at L nostril, no bleeding in oropharynx  CTA b/l  RRR s1s2  no le edema  A&Ox3, PERRLA, EOMI, no nystagmus, CN2-12 grossly intact, no pronator drift, normal finger to nose and rapid alternating finger movements, normal sensation and 5/5 strength in all extremities, normal gait, symmetric patellar reflexes    Given  chest discomfort will treat as hypertensive emergency with IV labetalol while awaiting trop results and EKG. Do not think ICH, CHF, CVA or AD.  BP likely partially elevated due to pain, will treat pain. Plan for EKG, labs, BP control, likely ENT c/s.

## 2018-08-31 NOTE — ED ADULT NURSE NOTE - NSIMPLEMENTINTERV_GEN_ALL_ED
Implemented All Universal Safety Interventions:  Cook to call system. Call bell, personal items and telephone within reach. Instruct patient to call for assistance. Room bathroom lighting operational. Non-slip footwear when patient is off stretcher. Physically safe environment: no spills, clutter or unnecessary equipment. Stretcher in lowest position, wheels locked, appropriate side rails in place.

## 2018-08-31 NOTE — ED ADULT NURSE NOTE - OBJECTIVE STATEMENT
Patient presented to ED from ENT office for evaluation of elevated BP in ENT office when he was going to get nasal packing removed.  Packing maintained and sent to ED.  Blood work drawn and sent to lab, will continue to monitor patient closely. Eve RICE

## 2018-09-01 ENCOUNTER — EMERGENCY (EMERGENCY)
Facility: HOSPITAL | Age: 63
LOS: 1 days | Discharge: HOME | End: 2018-09-01
Admitting: EMERGENCY MEDICINE
Payer: OTHER MISCELLANEOUS

## 2018-09-01 VITALS
RESPIRATION RATE: 16 BRPM | HEART RATE: 54 BPM | TEMPERATURE: 98 F | SYSTOLIC BLOOD PRESSURE: 133 MMHG | OXYGEN SATURATION: 100 % | DIASTOLIC BLOOD PRESSURE: 78 MMHG

## 2018-09-01 DIAGNOSIS — M67.00 SHORT ACHILLES TENDON (ACQUIRED), UNSPECIFIED ANKLE: Chronic | ICD-10-CM

## 2018-09-01 PROCEDURE — 99281 EMR DPT VST MAYX REQ PHY/QHP: CPT

## 2018-09-01 NOTE — ED PROVIDER NOTE - OBJECTIVE STATEMENT
61 y/o male, no pmh had left nosril packed 2 days ago when he had epistaxis. He was going to have packing removed yesterday but his blood pressure was too high. Pt returns today for packing removal. /78. Denies bleeding. 63 y/o male, had left nosril packed 2 days ago when he had epistaxis. He was going to have packing removed yesterday but his blood pressure was too high. Pt returns today for packing removal. /78. Denies bleeding.

## 2018-09-06 PROBLEM — I10 ESSENTIAL (PRIMARY) HYPERTENSION: Chronic | Status: ACTIVE | Noted: 2018-08-30

## 2018-09-17 ENCOUNTER — APPOINTMENT (OUTPATIENT)
Dept: ORTHOPEDIC SURGERY | Facility: CLINIC | Age: 63
End: 2018-09-17

## 2018-10-11 ENCOUNTER — APPOINTMENT (OUTPATIENT)
Dept: OTHER | Facility: CLINIC | Age: 63
End: 2018-10-11
Payer: COMMERCIAL

## 2018-10-11 VITALS
OXYGEN SATURATION: 100 % | RESPIRATION RATE: 16 BRPM | HEIGHT: 73 IN | WEIGHT: 185 LBS | HEART RATE: 57 BPM | DIASTOLIC BLOOD PRESSURE: 99 MMHG | SYSTOLIC BLOOD PRESSURE: 164 MMHG | BODY MASS INDEX: 24.52 KG/M2

## 2018-10-11 VITALS — DIASTOLIC BLOOD PRESSURE: 90 MMHG | SYSTOLIC BLOOD PRESSURE: 150 MMHG

## 2018-10-11 DIAGNOSIS — Z87.898 PERSONAL HISTORY OF OTHER SPECIFIED CONDITIONS: ICD-10-CM

## 2018-10-11 DIAGNOSIS — Z78.9 OTHER SPECIFIED HEALTH STATUS: ICD-10-CM

## 2018-10-11 DIAGNOSIS — Z87.39 PERSONAL HISTORY OF OTHER DISEASES OF THE MUSCULOSKELETAL SYSTEM AND CONNECTIVE TISSUE: ICD-10-CM

## 2018-10-11 PROCEDURE — 94010 BREATHING CAPACITY TEST: CPT

## 2018-10-11 PROCEDURE — 96150: CPT

## 2018-10-11 PROCEDURE — 99396 PREV VISIT EST AGE 40-64: CPT

## 2018-10-12 ENCOUNTER — APPOINTMENT (OUTPATIENT)
Dept: DERMATOLOGY | Facility: CLINIC | Age: 63
End: 2018-10-12
Payer: COMMERCIAL

## 2018-10-12 VITALS — DIASTOLIC BLOOD PRESSURE: 78 MMHG | SYSTOLIC BLOOD PRESSURE: 140 MMHG

## 2018-10-12 DIAGNOSIS — L70.0 ACNE VULGARIS: ICD-10-CM

## 2018-10-12 DIAGNOSIS — L81.0 POSTINFLAMMATORY HYPERPIGMENTATION: ICD-10-CM

## 2018-10-12 DIAGNOSIS — Z87.39 PERSONAL HISTORY OF OTHER DISEASES OF THE MUSCULOSKELETAL SYSTEM AND CONNECTIVE TISSUE: ICD-10-CM

## 2018-10-12 DIAGNOSIS — Z86.010 PERSONAL HISTORY OF COLONIC POLYPS: ICD-10-CM

## 2018-10-12 DIAGNOSIS — Z87.898 PERSONAL HISTORY OF OTHER SPECIFIED CONDITIONS: ICD-10-CM

## 2018-10-12 DIAGNOSIS — R22.9 LOCALIZED SWELLING, MASS AND LUMP, UNSPECIFIED: ICD-10-CM

## 2018-10-12 LAB
ALBUMIN SERPL ELPH-MCNC: 4.4 G/DL
ALP BLD-CCNC: 52 U/L
ALT SERPL-CCNC: 13 U/L
ANION GAP SERPL CALC-SCNC: 12 MMOL/L
APPEARANCE: CLEAR
AST SERPL-CCNC: 25 U/L
BACTERIA: NEGATIVE
BASOPHILS # BLD AUTO: 0.02 K/UL
BASOPHILS NFR BLD AUTO: 0.6 %
BILIRUB SERPL-MCNC: 0.4 MG/DL
BILIRUBIN URINE: NEGATIVE
BLOOD URINE: NEGATIVE
BUN SERPL-MCNC: 18 MG/DL
CALCIUM SERPL-MCNC: 9.7 MG/DL
CHLORIDE SERPL-SCNC: 104 MMOL/L
CHOLEST SERPL-MCNC: 116 MG/DL
CHOLEST/HDLC SERPL: 2.1 RATIO
CO2 SERPL-SCNC: 27 MMOL/L
COLOR: YELLOW
CREAT SERPL-MCNC: 1.36 MG/DL
EOSINOPHIL # BLD AUTO: 0.16 K/UL
EOSINOPHIL NFR BLD AUTO: 4.7 %
GLUCOSE QUALITATIVE U: NEGATIVE MG/DL
GLUCOSE SERPL-MCNC: 84 MG/DL
HCT VFR BLD CALC: 34.3 %
HDLC SERPL-MCNC: 54 MG/DL
HGB BLD-MCNC: 11.1 G/DL
HYALINE CASTS: 1 /LPF
IMM GRANULOCYTES NFR BLD AUTO: 0 %
KETONES URINE: NEGATIVE
LDLC SERPL CALC-MCNC: 53 MG/DL
LEUKOCYTE ESTERASE URINE: NEGATIVE
LYMPHOCYTES # BLD AUTO: 1.33 K/UL
LYMPHOCYTES NFR BLD AUTO: 39.3 %
MAN DIFF?: NORMAL
MCHC RBC-ENTMCNC: 27.1 PG
MCHC RBC-ENTMCNC: 32.4 GM/DL
MCV RBC AUTO: 83.9 FL
MICROSCOPIC-UA: NORMAL
MONOCYTES # BLD AUTO: 0.39 K/UL
MONOCYTES NFR BLD AUTO: 11.5 %
NEUTROPHILS # BLD AUTO: 1.48 K/UL
NEUTROPHILS NFR BLD AUTO: 43.9 %
NITRITE URINE: NEGATIVE
PH URINE: 7
PLATELET # BLD AUTO: 247 K/UL
POTASSIUM SERPL-SCNC: 4.1 MMOL/L
PROT SERPL-MCNC: 6.9 G/DL
PROTEIN URINE: ABNORMAL MG/DL
RBC # BLD: 4.09 M/UL
RBC # FLD: 14.5 %
RED BLOOD CELLS URINE: 1 /HPF
SODIUM SERPL-SCNC: 143 MMOL/L
SPECIFIC GRAVITY URINE: 1.02
SQUAMOUS EPITHELIAL CELLS: 4 /HPF
TRIGL SERPL-MCNC: 46 MG/DL
UROBILINOGEN URINE: NEGATIVE MG/DL
WBC # FLD AUTO: 3.38 K/UL
WHITE BLOOD CELLS URINE: 3 /HPF

## 2018-10-12 PROCEDURE — 99203 OFFICE O/P NEW LOW 30 MIN: CPT

## 2018-10-15 ENCOUNTER — APPOINTMENT (OUTPATIENT)
Dept: PULMONOLOGY | Facility: CLINIC | Age: 63
End: 2018-10-15
Payer: COMMERCIAL

## 2018-10-15 VITALS
OXYGEN SATURATION: 100 % | BODY MASS INDEX: 24.52 KG/M2 | TEMPERATURE: 98.6 F | HEART RATE: 52 BPM | WEIGHT: 185 LBS | RESPIRATION RATE: 16 BRPM | HEIGHT: 73 IN | DIASTOLIC BLOOD PRESSURE: 90 MMHG | SYSTOLIC BLOOD PRESSURE: 167 MMHG

## 2018-10-15 DIAGNOSIS — Z04.9 ENCOUNTER FOR EXAMINATION AND OBSERVATION FOR UNSPECIFIED REASON: ICD-10-CM

## 2018-10-15 PROCEDURE — 99213 OFFICE O/P EST LOW 20 MIN: CPT

## 2018-10-23 ENCOUNTER — APPOINTMENT (OUTPATIENT)
Dept: PULMONOLOGY | Facility: CLINIC | Age: 63
End: 2018-10-23
Payer: COMMERCIAL

## 2018-10-23 PROCEDURE — 95800 SLP STDY UNATTENDED: CPT | Mod: 52

## 2018-10-25 PROCEDURE — 95800 SLP STDY UNATTENDED: CPT | Mod: 52

## 2018-10-26 PROCEDURE — 95800 SLP STDY UNATTENDED: CPT | Mod: 52

## 2018-10-27 ENCOUNTER — FORM ENCOUNTER (OUTPATIENT)
Age: 63
End: 2018-10-27

## 2018-11-01 ENCOUNTER — APPOINTMENT (OUTPATIENT)
Dept: PULMONOLOGY | Facility: CLINIC | Age: 63
End: 2018-11-01
Payer: COMMERCIAL

## 2018-11-01 VITALS
RESPIRATION RATE: 18 BRPM | SYSTOLIC BLOOD PRESSURE: 144 MMHG | OXYGEN SATURATION: 100 % | DIASTOLIC BLOOD PRESSURE: 91 MMHG | HEART RATE: 56 BPM | HEIGHT: 73 IN | TEMPERATURE: 98.3 F

## 2018-11-01 PROCEDURE — 99213 OFFICE O/P EST LOW 20 MIN: CPT

## 2018-12-20 ENCOUNTER — APPOINTMENT (OUTPATIENT)
Dept: PULMONOLOGY | Facility: CLINIC | Age: 63
End: 2018-12-20

## 2019-01-16 ENCOUNTER — FORM ENCOUNTER (OUTPATIENT)
Age: 64
End: 2019-01-16

## 2019-01-16 DIAGNOSIS — Z03.89 ENCOUNTER FOR OBSERVATION FOR OTHER SUSPECTED DISEASES AND CONDITIONS RULED OUT: ICD-10-CM

## 2019-02-26 ENCOUNTER — APPOINTMENT (OUTPATIENT)
Dept: PULMONOLOGY | Facility: CLINIC | Age: 64
End: 2019-02-26

## 2019-04-09 ENCOUNTER — CHART COPY (OUTPATIENT)
Age: 64
End: 2019-04-09

## 2019-04-17 ENCOUNTER — FORM ENCOUNTER (OUTPATIENT)
Age: 64
End: 2019-04-17

## 2019-04-17 ENCOUNTER — APPOINTMENT (OUTPATIENT)
Dept: OTOLARYNGOLOGY | Facility: CLINIC | Age: 64
End: 2019-04-17
Payer: COMMERCIAL

## 2019-04-17 VITALS
SYSTOLIC BLOOD PRESSURE: 144 MMHG | WEIGHT: 176 LBS | BODY MASS INDEX: 23.33 KG/M2 | HEIGHT: 73 IN | OXYGEN SATURATION: 98 % | HEART RATE: 83 BPM | DIASTOLIC BLOOD PRESSURE: 90 MMHG

## 2019-04-17 DIAGNOSIS — Z82.5 FAMILY HISTORY OF ASTHMA AND OTHER CHRONIC LOWER RESPIRATORY DISEASES: ICD-10-CM

## 2019-04-17 DIAGNOSIS — E04.1 NONTOXIC SINGLE THYROID NODULE: ICD-10-CM

## 2019-04-17 PROCEDURE — 99203 OFFICE O/P NEW LOW 30 MIN: CPT | Mod: 25

## 2019-04-17 PROCEDURE — 31575 DIAGNOSTIC LARYNGOSCOPY: CPT

## 2019-04-18 ENCOUNTER — OUTPATIENT (OUTPATIENT)
Dept: OUTPATIENT SERVICES | Facility: HOSPITAL | Age: 64
LOS: 1 days | End: 2019-04-18
Payer: COMMERCIAL

## 2019-04-18 ENCOUNTER — APPOINTMENT (OUTPATIENT)
Dept: ULTRASOUND IMAGING | Facility: HOSPITAL | Age: 64
End: 2019-04-18
Payer: COMMERCIAL

## 2019-04-18 DIAGNOSIS — M67.00 SHORT ACHILLES TENDON (ACQUIRED), UNSPECIFIED ANKLE: Chronic | ICD-10-CM

## 2019-04-18 LAB
T3 SERPL-MCNC: 111 NG/DL
T3FREE SERPL-MCNC: 2.89 PG/ML
T4 FREE SERPL-MCNC: 1.3 NG/DL
T4 SERPL-MCNC: 8.1 UG/DL
THYROGLOB AB SERPL-ACNC: <20 IU/ML
THYROPEROXIDASE AB SERPL IA-ACNC: <10 IU/ML
TSH SERPL-ACNC: 0.99 UIU/ML

## 2019-04-18 PROCEDURE — 76536 US EXAM OF HEAD AND NECK: CPT

## 2019-04-18 PROCEDURE — 76536 US EXAM OF HEAD AND NECK: CPT | Mod: 26

## 2019-04-18 NOTE — PHYSICAL EXAM
[Midline] : trachea located in midline position [Laryngoscopy Performed] : laryngoscopy was performed, see procedure section for findings [Normal] : no rashes [de-identified] : palpable thyroid-cricoid cartilage on the left [de-identified] : palpable right thyroid nodule, parotid and submandibular glands normal. [de-identified] : soft, mobile R level 3 node, and b/l level 1 nodes

## 2019-04-18 NOTE — PROCEDURE
[Image(s) Captured] : image(s) captured and filed [Unable to Cooperate with Mirror] : patient unable to cooperate with mirror [Hoarseness] : hoarseness not clearly evaluated by indirect laryngoscopy [None] : none [Flexible Endoscope] : examined with the flexible endoscope [de-identified] : Flexible fiberoptic laryngoscope advanced orally, no oropharyngeal lesions or masses identified, larynx visualized, adequate and symmetric cord adduction and abduction noted, no vocal cord masses or lesions noted, there are clear secretions noted.\par

## 2019-04-18 NOTE — ASSESSMENT
[FreeTextEntry1] : 63M referred by his ortho Bre for a large left thyroid cyst/nodule found incidentally on MRI CS w/o contrast.\par \par Plan:\par - US thyroid\par - FNA L thyroid nodule w/Afirma.\par - TFTs\par - Will f/u with pt with results.\par - Pt verbalized understanding of about plan.

## 2019-04-18 NOTE — HISTORY OF PRESENT ILLNESS
[de-identified] : 63M with hx HTN, KHURRAM, vertical sleeve gastrectomy, and R TKR referred by his orthopedist Gregorio Díaz for incidental thyroid nodule he believes was found on physical exam.  He has not had any thyroid work-up/imaging in the past.  He had a R TKR 2/12/19, planning L TKR with Bre.  Since his gastrectomy about a year ago, he has lost 300 lbs and has had heartburn since, which is being managed by occupational medicine, and reports fatigue associated with not eating well.  He denies dysphagia, dyspnea, globus, throat clearing. He reports mild hoarseness occasionally.\par \par On review of R portal, there was a MRI CS w/o contrast performed on 1/20/19 which showed a large complex cyst/nodule within the left thyroid lobe extending into the superior mediastinum, thyroid US recommended.

## 2019-04-18 NOTE — REVIEW OF SYSTEMS
[Patient Intake Form Reviewed] : Patient intake form was reviewed [Feeling Poorly] : feeling poorly [Feeling Tired] : feeling tired [Recent Weight Loss (___ Lbs)] : recent [unfilled] ~Ulb weight loss [Heartburn] : heartburn [As Noted in HPI] : as noted in HPI [Negative] : Heme/Lymph [Fever] : no fever [Chills] : no chills [FreeTextEntry2] : lost 300 lbs and feeling fatigued since gastrectomy about a year ago [FreeTextEntry6] : hx KHURRAM, uses CPAP mask

## 2019-05-01 ENCOUNTER — FORM ENCOUNTER (OUTPATIENT)
Age: 64
End: 2019-05-01

## 2019-05-02 ENCOUNTER — OUTPATIENT (OUTPATIENT)
Dept: OUTPATIENT SERVICES | Facility: HOSPITAL | Age: 64
LOS: 1 days | End: 2019-05-02
Payer: COMMERCIAL

## 2019-05-02 ENCOUNTER — RESULT REVIEW (OUTPATIENT)
Age: 64
End: 2019-05-02

## 2019-05-02 ENCOUNTER — APPOINTMENT (OUTPATIENT)
Dept: ULTRASOUND IMAGING | Facility: HOSPITAL | Age: 64
End: 2019-05-02
Payer: COMMERCIAL

## 2019-05-02 DIAGNOSIS — M67.00 SHORT ACHILLES TENDON (ACQUIRED), UNSPECIFIED ANKLE: Chronic | ICD-10-CM

## 2019-05-02 PROCEDURE — 88173 CYTOPATH EVAL FNA REPORT: CPT

## 2019-05-02 PROCEDURE — 10005 FNA BX W/US GDN 1ST LES: CPT

## 2019-05-02 PROCEDURE — 88305 TISSUE EXAM BY PATHOLOGIST: CPT

## 2019-05-03 LAB — NON-GYNECOLOGICAL CYTOLOGY STUDY: SIGNIFICANT CHANGE UP

## 2019-05-14 ENCOUNTER — MOBILE ON CALL (OUTPATIENT)
Age: 64
End: 2019-05-14

## 2019-05-18 NOTE — ED ADULT TRIAGE NOTE - PAIN RATING/NUMBER SCALE (0-10): ACTIVITY
Observation goals:  #1:  Pt not taking adequate PO fluids.    #2:  Pt's pain is  Well controlled on scheduled PO pain meds  #3:  Pt had small stool this morning  #4:  No bleeding noted  #5:  No respiratory distress   0

## 2019-08-22 ENCOUNTER — OUTPATIENT (OUTPATIENT)
Dept: OUTPATIENT SERVICES | Facility: HOSPITAL | Age: 64
LOS: 1 days | End: 2019-08-22
Payer: COMMERCIAL

## 2019-08-22 VITALS
HEART RATE: 57 BPM | SYSTOLIC BLOOD PRESSURE: 150 MMHG | OXYGEN SATURATION: 100 % | DIASTOLIC BLOOD PRESSURE: 90 MMHG | TEMPERATURE: 98 F | RESPIRATION RATE: 16 BRPM | WEIGHT: 179.02 LBS | HEIGHT: 71 IN

## 2019-08-22 DIAGNOSIS — M67.00 SHORT ACHILLES TENDON (ACQUIRED), UNSPECIFIED ANKLE: Chronic | ICD-10-CM

## 2019-08-22 DIAGNOSIS — Z98.890 OTHER SPECIFIED POSTPROCEDURAL STATES: Chronic | ICD-10-CM

## 2019-08-22 DIAGNOSIS — E66.01 MORBID (SEVERE) OBESITY DUE TO EXCESS CALORIES: ICD-10-CM

## 2019-08-22 DIAGNOSIS — Z90.3 ACQUIRED ABSENCE OF STOMACH [PART OF]: Chronic | ICD-10-CM

## 2019-08-22 DIAGNOSIS — K21.9 GASTRO-ESOPHAGEAL REFLUX DISEASE WITHOUT ESOPHAGITIS: ICD-10-CM

## 2019-08-22 DIAGNOSIS — Z96.659 PRESENCE OF UNSPECIFIED ARTIFICIAL KNEE JOINT: Chronic | ICD-10-CM

## 2019-08-22 DIAGNOSIS — Z01.818 ENCOUNTER FOR OTHER PREPROCEDURAL EXAMINATION: ICD-10-CM

## 2019-08-22 DIAGNOSIS — R82.90 UNSPECIFIED ABNORMAL FINDINGS IN URINE: ICD-10-CM

## 2019-08-22 LAB
ANION GAP SERPL CALC-SCNC: 5 MMOL/L — SIGNIFICANT CHANGE UP (ref 5–17)
APTT BLD: 31.3 SEC — SIGNIFICANT CHANGE UP (ref 27.5–36.3)
BASOPHILS # BLD AUTO: 0.01 K/UL — SIGNIFICANT CHANGE UP (ref 0–0.2)
BASOPHILS NFR BLD AUTO: 0.3 % — SIGNIFICANT CHANGE UP (ref 0–2)
BUN SERPL-MCNC: 15 MG/DL — SIGNIFICANT CHANGE UP (ref 7–23)
CALCIUM SERPL-MCNC: 9.2 MG/DL — SIGNIFICANT CHANGE UP (ref 8.5–10.1)
CHLORIDE SERPL-SCNC: 110 MMOL/L — HIGH (ref 96–108)
CO2 SERPL-SCNC: 30 MMOL/L — SIGNIFICANT CHANGE UP (ref 22–31)
CREAT SERPL-MCNC: 1.32 MG/DL — HIGH (ref 0.5–1.3)
EOSINOPHIL # BLD AUTO: 0.12 K/UL — SIGNIFICANT CHANGE UP (ref 0–0.5)
EOSINOPHIL NFR BLD AUTO: 3.2 % — SIGNIFICANT CHANGE UP (ref 0–6)
GLUCOSE SERPL-MCNC: 76 MG/DL — SIGNIFICANT CHANGE UP (ref 70–99)
HCT VFR BLD CALC: 33.2 % — LOW (ref 39–50)
HGB BLD-MCNC: 10.6 G/DL — LOW (ref 13–17)
IMM GRANULOCYTES NFR BLD AUTO: 0 % — SIGNIFICANT CHANGE UP (ref 0–1.5)
INR BLD: 1.12 RATIO — SIGNIFICANT CHANGE UP (ref 0.88–1.16)
LYMPHOCYTES # BLD AUTO: 1.53 K/UL — SIGNIFICANT CHANGE UP (ref 1–3.3)
LYMPHOCYTES # BLD AUTO: 41.2 % — SIGNIFICANT CHANGE UP (ref 13–44)
MCHC RBC-ENTMCNC: 26.4 PG — LOW (ref 27–34)
MCHC RBC-ENTMCNC: 31.9 GM/DL — LOW (ref 32–36)
MCV RBC AUTO: 82.8 FL — SIGNIFICANT CHANGE UP (ref 80–100)
MONOCYTES # BLD AUTO: 0.28 K/UL — SIGNIFICANT CHANGE UP (ref 0–0.9)
MONOCYTES NFR BLD AUTO: 7.5 % — SIGNIFICANT CHANGE UP (ref 2–14)
NEUTROPHILS # BLD AUTO: 1.77 K/UL — LOW (ref 1.8–7.4)
NEUTROPHILS NFR BLD AUTO: 47.8 % — SIGNIFICANT CHANGE UP (ref 43–77)
PLATELET # BLD AUTO: 256 K/UL — SIGNIFICANT CHANGE UP (ref 150–400)
POTASSIUM SERPL-MCNC: 3.5 MMOL/L — SIGNIFICANT CHANGE UP (ref 3.5–5.3)
POTASSIUM SERPL-SCNC: 3.5 MMOL/L — SIGNIFICANT CHANGE UP (ref 3.5–5.3)
PROTHROM AB SERPL-ACNC: 12.5 SEC — SIGNIFICANT CHANGE UP (ref 10–12.9)
RBC # BLD: 4.01 M/UL — LOW (ref 4.2–5.8)
RBC # FLD: 14.9 % — HIGH (ref 10.3–14.5)
SODIUM SERPL-SCNC: 145 MMOL/L — SIGNIFICANT CHANGE UP (ref 135–145)
WBC # BLD: 3.71 K/UL — LOW (ref 3.8–10.5)
WBC # FLD AUTO: 3.71 K/UL — LOW (ref 3.8–10.5)

## 2019-08-22 PROCEDURE — 85730 THROMBOPLASTIN TIME PARTIAL: CPT

## 2019-08-22 PROCEDURE — 82180 ASSAY OF ASCORBIC ACID: CPT

## 2019-08-22 PROCEDURE — 93005 ELECTROCARDIOGRAM TRACING: CPT

## 2019-08-22 PROCEDURE — 36415 COLL VENOUS BLD VENIPUNCTURE: CPT

## 2019-08-22 PROCEDURE — 82306 VITAMIN D 25 HYDROXY: CPT

## 2019-08-22 PROCEDURE — 80061 LIPID PANEL: CPT

## 2019-08-22 PROCEDURE — 85610 PROTHROMBIN TIME: CPT

## 2019-08-22 PROCEDURE — 84590 ASSAY OF VITAMIN A: CPT

## 2019-08-22 PROCEDURE — 84425 ASSAY OF VITAMIN B-1: CPT

## 2019-08-22 PROCEDURE — 84443 ASSAY THYROID STIM HORMONE: CPT

## 2019-08-22 PROCEDURE — 82310 ASSAY OF CALCIUM: CPT

## 2019-08-22 PROCEDURE — 84439 ASSAY OF FREE THYROXINE: CPT

## 2019-08-22 PROCEDURE — 83525 ASSAY OF INSULIN: CPT

## 2019-08-22 PROCEDURE — 84100 ASSAY OF PHOSPHORUS: CPT

## 2019-08-22 PROCEDURE — 80053 COMPREHEN METABOLIC PANEL: CPT

## 2019-08-22 PROCEDURE — 83970 ASSAY OF PARATHORMONE: CPT

## 2019-08-22 PROCEDURE — 82728 ASSAY OF FERRITIN: CPT

## 2019-08-22 PROCEDURE — 84630 ASSAY OF ZINC: CPT

## 2019-08-22 PROCEDURE — 82248 BILIRUBIN DIRECT: CPT

## 2019-08-22 PROCEDURE — G0463: CPT | Mod: 25

## 2019-08-22 PROCEDURE — 85025 COMPLETE CBC W/AUTO DIFF WBC: CPT

## 2019-08-22 PROCEDURE — 80048 BASIC METABOLIC PNL TOTAL CA: CPT

## 2019-08-22 PROCEDURE — 86141 C-REACTIVE PROTEIN HS: CPT

## 2019-08-22 PROCEDURE — 85027 COMPLETE CBC AUTOMATED: CPT

## 2019-08-22 PROCEDURE — 83550 IRON BINDING TEST: CPT

## 2019-08-22 PROCEDURE — 82746 ASSAY OF FOLIC ACID SERUM: CPT

## 2019-08-22 PROCEDURE — 83036 HEMOGLOBIN GLYCOSYLATED A1C: CPT

## 2019-08-22 PROCEDURE — 93010 ELECTROCARDIOGRAM REPORT: CPT

## 2019-08-22 PROCEDURE — 84480 ASSAY TRIIODOTHYRONINE (T3): CPT

## 2019-08-22 PROCEDURE — 83735 ASSAY OF MAGNESIUM: CPT

## 2019-08-22 PROCEDURE — 82607 VITAMIN B-12: CPT

## 2019-08-22 PROCEDURE — 83540 ASSAY OF IRON: CPT

## 2019-08-22 RX ORDER — HYDRALAZINE HCL 50 MG
0 TABLET ORAL
Qty: 0 | Refills: 0 | DISCHARGE

## 2019-08-22 RX ORDER — NEBIVOLOL HYDROCHLORIDE 5 MG/1
1 TABLET ORAL
Qty: 0 | Refills: 0 | DISCHARGE

## 2019-08-22 RX ORDER — AMLODIPINE BESYLATE 2.5 MG/1
1 TABLET ORAL
Qty: 0 | Refills: 0 | DISCHARGE

## 2019-08-22 RX ORDER — FENOFIBRATE,MICRONIZED 130 MG
1 CAPSULE ORAL
Qty: 0 | Refills: 0 | DISCHARGE

## 2019-08-22 NOTE — H&P PST ADULT - ASSESSMENT
63 years old male present to PST prior to upper endoscopy with Dr. Gibbons.    Plan  1. Expect a call from Endoscopy the day before your procedure between 11am and 3pm.  2. Follow the GI doctor's instructions for preparation  and day before procedure activities.  3. Follow the GI doctor's  instructions for medications.

## 2019-08-22 NOTE — H&P PST ADULT - NSICDXPASTMEDICALHX_GEN_ALL_CORE_FT
PAST MEDICAL HISTORY:  Chronic GERD     DM (diabetes mellitus) history of. stopped medications since weight  loss surgery    HTN (hypertension)     Hyperlipidemia History of. Off medications since weight loss surgery    OA (osteoarthritis) of knee bilateral replaced    Sleep apnea CPAP Machine    Thyroid nodule     Torn rotator cuff bilateral

## 2019-08-22 NOTE — H&P PST ADULT - HISTORY OF PRESENT ILLNESS
63 years old male with GERD and a history of morbid obesity. He had a Sleeve Gastrectomy "2 years ago".  Admits to increasing "heartburn". Planned upper endoscopy.

## 2019-08-22 NOTE — H&P PST ADULT - NSICDXPASTSURGICALHX_GEN_ALL_CORE_FT
PAST SURGICAL HISTORY:  Achilles tendon contracture     H/O total knee replacement left 7/9/2019, left 4/ 2019    History of colonoscopy     History of sleeve gastrectomy 2017

## 2019-08-22 NOTE — H&P PST ADULT - MUSCULOSKELETAL COMMENTS
to left knee replacement site. Joint pain to bilateral shoulders to left knee. Decrease ROM to bilateral shoulders

## 2019-08-23 ENCOUNTER — OUTPATIENT (OUTPATIENT)
Dept: OUTPATIENT SERVICES | Facility: HOSPITAL | Age: 64
LOS: 1 days | Discharge: HOME | End: 2019-08-23
Payer: COMMERCIAL

## 2019-08-23 ENCOUNTER — RESULT REVIEW (OUTPATIENT)
Age: 64
End: 2019-08-23

## 2019-08-23 VITALS
OXYGEN SATURATION: 100 % | WEIGHT: 179.02 LBS | RESPIRATION RATE: 20 BRPM | SYSTOLIC BLOOD PRESSURE: 197 MMHG | DIASTOLIC BLOOD PRESSURE: 105 MMHG | HEIGHT: 71 IN | HEART RATE: 48 BPM | TEMPERATURE: 98 F

## 2019-08-23 DIAGNOSIS — E66.01 MORBID (SEVERE) OBESITY DUE TO EXCESS CALORIES: ICD-10-CM

## 2019-08-23 DIAGNOSIS — Z90.3 ACQUIRED ABSENCE OF STOMACH [PART OF]: Chronic | ICD-10-CM

## 2019-08-23 DIAGNOSIS — Z96.659 PRESENCE OF UNSPECIFIED ARTIFICIAL KNEE JOINT: Chronic | ICD-10-CM

## 2019-08-23 DIAGNOSIS — K21.9 GASTRO-ESOPHAGEAL REFLUX DISEASE WITHOUT ESOPHAGITIS: ICD-10-CM

## 2019-08-23 DIAGNOSIS — R82.90 UNSPECIFIED ABNORMAL FINDINGS IN URINE: ICD-10-CM

## 2019-08-23 DIAGNOSIS — Z98.890 OTHER SPECIFIED POSTPROCEDURAL STATES: Chronic | ICD-10-CM

## 2019-08-23 DIAGNOSIS — Z01.818 ENCOUNTER FOR OTHER PREPROCEDURAL EXAMINATION: ICD-10-CM

## 2019-08-23 DIAGNOSIS — M67.00 SHORT ACHILLES TENDON (ACQUIRED), UNSPECIFIED ANKLE: Chronic | ICD-10-CM

## 2019-08-23 PROBLEM — M75.100 UNSPECIFIED ROTATOR CUFF TEAR OR RUPTURE OF UNSPECIFIED SHOULDER, NOT SPECIFIED AS TRAUMATIC: Chronic | Status: ACTIVE | Noted: 2019-08-22

## 2019-08-23 PROBLEM — E04.1 NONTOXIC SINGLE THYROID NODULE: Chronic | Status: ACTIVE | Noted: 2019-08-22

## 2019-08-23 PROBLEM — E78.5 HYPERLIPIDEMIA, UNSPECIFIED: Chronic | Status: ACTIVE | Noted: 2017-07-12

## 2019-08-23 PROBLEM — M17.10 UNILATERAL PRIMARY OSTEOARTHRITIS, UNSPECIFIED KNEE: Chronic | Status: ACTIVE | Noted: 2019-08-22

## 2019-08-23 PROCEDURE — 88312 SPECIAL STAINS GROUP 1: CPT

## 2019-08-23 PROCEDURE — 88305 TISSUE EXAM BY PATHOLOGIST: CPT | Mod: 26

## 2019-08-23 PROCEDURE — 88313 SPECIAL STAINS GROUP 2: CPT

## 2019-08-23 PROCEDURE — 88313 SPECIAL STAINS GROUP 2: CPT | Mod: 26

## 2019-08-23 PROCEDURE — 88305 TISSUE EXAM BY PATHOLOGIST: CPT

## 2019-08-23 PROCEDURE — 88312 SPECIAL STAINS GROUP 1: CPT | Mod: 26

## 2019-08-23 NOTE — ASU PREOP CHECKLIST - HEIGHT IN CM
Problem: Falls - Risk of 
Goal: *Absence of Falls Document Hamp Fawad Fall Risk and appropriate interventions in the flowsheet. Outcome: Progressing Towards Goal 
Fall Risk Interventions: 
Mobility Interventions: Communicate number of staff needed for ambulation/transfer Mentation Interventions: Adequate sleep, hydration, pain control Medication Interventions: Evaluate medications/consider consulting pharmacy Elimination Interventions: Patient to call for help with toileting needs History of Falls Interventions: Evaluate medications/consider consulting pharmacy 180.34

## 2019-08-23 NOTE — ASU PATIENT PROFILE, ADULT - PSH
Achilles tendon contracture    H/O total knee replacement  left 7/9/2019, left 4/ 2019  History of colonoscopy    History of sleeve gastrectomy  2017

## 2019-08-23 NOTE — ASU PATIENT PROFILE, ADULT - PMH
Chronic GERD    DM (diabetes mellitus)  history of. stopped medications since weight  loss surgery  HTN (hypertension)    Hyperlipidemia  History of. Off medications since weight loss surgery  OA (osteoarthritis) of knee  bilateral replaced  Sleep apnea  CPAP Machine  Thyroid nodule    Torn rotator cuff  bilateral

## 2019-08-26 DIAGNOSIS — K29.50 UNSPECIFIED CHRONIC GASTRITIS WITHOUT BLEEDING: ICD-10-CM

## 2019-08-26 DIAGNOSIS — Z98.84 BARIATRIC SURGERY STATUS: ICD-10-CM

## 2019-08-26 DIAGNOSIS — K21.9 GASTRO-ESOPHAGEAL REFLUX DISEASE WITHOUT ESOPHAGITIS: ICD-10-CM

## 2019-08-26 DIAGNOSIS — I10 ESSENTIAL (PRIMARY) HYPERTENSION: ICD-10-CM

## 2019-08-26 DIAGNOSIS — G47.33 OBSTRUCTIVE SLEEP APNEA (ADULT) (PEDIATRIC): ICD-10-CM

## 2019-08-26 DIAGNOSIS — Z79.82 LONG TERM (CURRENT) USE OF ASPIRIN: ICD-10-CM

## 2019-09-09 VITALS
OXYGEN SATURATION: 100 % | SYSTOLIC BLOOD PRESSURE: 175 MMHG | DIASTOLIC BLOOD PRESSURE: 96 MMHG | TEMPERATURE: 98 F | RESPIRATION RATE: 18 BRPM | WEIGHT: 184.09 LBS | HEIGHT: 72 IN | HEART RATE: 72 BPM

## 2019-09-09 NOTE — ASU PATIENT PROFILE, ADULT - PSH
Achilles tendon contracture    H/O total knee replacement  left 7/9/2019, left 4/ 2019  History of colonoscopy    History of sleeve gastrectomy  2017 Achilles tendon contracture    H/O total knee replacement  left 7/9/2019,  History of colonoscopy    History of sleeve gastrectomy  2017  History of total knee replacement, right  4/2019

## 2019-09-09 NOTE — ASU PATIENT PROFILE, ADULT - PMH
Chronic GERD    DM (diabetes mellitus)  history of. stopped medications since weight  loss surgery  HTN (hypertension)    Hyperlipidemia  History of. Off medications since weight loss surgery  Morbid obesity    OA (osteoarthritis) of knee  bilateral replaced  Sleep apnea  CPAP Machine  Thyroid nodule    Torn rotator cuff  bilateral

## 2019-09-10 ENCOUNTER — APPOINTMENT (OUTPATIENT)
Dept: OTOLARYNGOLOGY | Facility: HOSPITAL | Age: 64
End: 2019-09-10

## 2019-09-10 ENCOUNTER — RESULT REVIEW (OUTPATIENT)
Age: 64
End: 2019-09-10

## 2019-09-10 ENCOUNTER — INPATIENT (INPATIENT)
Facility: HOSPITAL | Age: 64
LOS: 4 days | Discharge: HOME | DRG: 625 | End: 2019-09-15
Attending: OTOLARYNGOLOGY | Admitting: OTOLARYNGOLOGY
Payer: COMMERCIAL

## 2019-09-10 DIAGNOSIS — Z96.659 PRESENCE OF UNSPECIFIED ARTIFICIAL KNEE JOINT: Chronic | ICD-10-CM

## 2019-09-10 DIAGNOSIS — M67.00 SHORT ACHILLES TENDON (ACQUIRED), UNSPECIFIED ANKLE: Chronic | ICD-10-CM

## 2019-09-10 DIAGNOSIS — Z98.890 OTHER SPECIFIED POSTPROCEDURAL STATES: Chronic | ICD-10-CM

## 2019-09-10 DIAGNOSIS — Z90.3 ACQUIRED ABSENCE OF STOMACH [PART OF]: Chronic | ICD-10-CM

## 2019-09-10 DIAGNOSIS — Z96.651 PRESENCE OF RIGHT ARTIFICIAL KNEE JOINT: Chronic | ICD-10-CM

## 2019-09-10 PROCEDURE — 71045 X-RAY EXAM CHEST 1 VIEW: CPT | Mod: 26

## 2019-09-10 RX ORDER — AMLODIPINE BESYLATE 2.5 MG/1
10 TABLET ORAL DAILY
Refills: 0 | Status: DISCONTINUED | OUTPATIENT
Start: 2019-09-10 | End: 2019-09-11

## 2019-09-10 RX ORDER — SODIUM CHLORIDE 9 MG/ML
3 INJECTION INTRAMUSCULAR; INTRAVENOUS; SUBCUTANEOUS ONCE
Refills: 0 | Status: COMPLETED | OUTPATIENT
Start: 2019-09-10 | End: 2019-09-10

## 2019-09-10 RX ORDER — HYDROMORPHONE HYDROCHLORIDE 2 MG/ML
0.5 INJECTION INTRAMUSCULAR; INTRAVENOUS; SUBCUTANEOUS
Refills: 0 | Status: DISCONTINUED | OUTPATIENT
Start: 2019-09-10 | End: 2019-09-10

## 2019-09-10 RX ORDER — METOPROLOL TARTRATE 50 MG
5 TABLET ORAL ONCE
Refills: 0 | Status: COMPLETED | OUTPATIENT
Start: 2019-09-10 | End: 2019-09-10

## 2019-09-10 RX ORDER — OXYCODONE HYDROCHLORIDE 5 MG/1
5 TABLET ORAL EVERY 4 HOURS
Refills: 0 | Status: DISCONTINUED | OUTPATIENT
Start: 2019-09-10 | End: 2019-09-11

## 2019-09-10 RX ORDER — CEFAZOLIN SODIUM 1 G
1000 VIAL (EA) INJECTION EVERY 8 HOURS
Refills: 0 | Status: COMPLETED | OUTPATIENT
Start: 2019-09-10 | End: 2019-09-11

## 2019-09-10 RX ORDER — HYDROMORPHONE HYDROCHLORIDE 2 MG/ML
0.5 INJECTION INTRAMUSCULAR; INTRAVENOUS; SUBCUTANEOUS ONCE
Refills: 0 | Status: DISCONTINUED | OUTPATIENT
Start: 2019-09-10 | End: 2019-09-10

## 2019-09-10 RX ORDER — ACETAMINOPHEN 500 MG
650 TABLET ORAL EVERY 6 HOURS
Refills: 0 | Status: DISCONTINUED | OUTPATIENT
Start: 2019-09-10 | End: 2019-09-11

## 2019-09-10 RX ORDER — SODIUM CHLORIDE 9 MG/ML
1000 INJECTION, SOLUTION INTRAVENOUS
Refills: 0 | Status: DISCONTINUED | OUTPATIENT
Start: 2019-09-10 | End: 2019-09-10

## 2019-09-10 RX ORDER — NEBIVOLOL HYDROCHLORIDE 5 MG/1
20 TABLET ORAL
Refills: 0 | Status: DISCONTINUED | OUTPATIENT
Start: 2019-09-10 | End: 2019-09-11

## 2019-09-10 RX ORDER — METOPROLOL TARTRATE 50 MG
5 TABLET ORAL EVERY 6 HOURS
Refills: 0 | Status: DISCONTINUED | OUTPATIENT
Start: 2019-09-10 | End: 2019-09-10

## 2019-09-10 RX ADMIN — HYDROMORPHONE HYDROCHLORIDE 0.5 MILLIGRAM(S): 2 INJECTION INTRAMUSCULAR; INTRAVENOUS; SUBCUTANEOUS at 18:34

## 2019-09-10 RX ADMIN — AMLODIPINE BESYLATE 10 MILLIGRAM(S): 2.5 TABLET ORAL at 17:06

## 2019-09-10 RX ADMIN — HYDROMORPHONE HYDROCHLORIDE 0.5 MILLIGRAM(S): 2 INJECTION INTRAMUSCULAR; INTRAVENOUS; SUBCUTANEOUS at 21:59

## 2019-09-10 RX ADMIN — Medication 100 MILLIGRAM(S): at 21:56

## 2019-09-10 RX ADMIN — NEBIVOLOL HYDROCHLORIDE 20 MILLIGRAM(S): 5 TABLET ORAL at 18:36

## 2019-09-10 RX ADMIN — HYDROMORPHONE HYDROCHLORIDE 0.5 MILLIGRAM(S): 2 INJECTION INTRAMUSCULAR; INTRAVENOUS; SUBCUTANEOUS at 19:00

## 2019-09-10 RX ADMIN — HYDROMORPHONE HYDROCHLORIDE 0.5 MILLIGRAM(S): 2 INJECTION INTRAMUSCULAR; INTRAVENOUS; SUBCUTANEOUS at 15:38

## 2019-09-10 RX ADMIN — HYDROMORPHONE HYDROCHLORIDE 0.5 MILLIGRAM(S): 2 INJECTION INTRAMUSCULAR; INTRAVENOUS; SUBCUTANEOUS at 17:06

## 2019-09-10 RX ADMIN — HYDROMORPHONE HYDROCHLORIDE 0.5 MILLIGRAM(S): 2 INJECTION INTRAMUSCULAR; INTRAVENOUS; SUBCUTANEOUS at 21:42

## 2019-09-10 RX ADMIN — Medication 5 MILLIGRAM(S): at 16:02

## 2019-09-10 RX ADMIN — SODIUM CHLORIDE 3 MILLILITER(S): 9 INJECTION INTRAMUSCULAR; INTRAVENOUS; SUBCUTANEOUS at 23:00

## 2019-09-10 RX ADMIN — HYDROMORPHONE HYDROCHLORIDE 0.5 MILLIGRAM(S): 2 INJECTION INTRAMUSCULAR; INTRAVENOUS; SUBCUTANEOUS at 16:03

## 2019-09-10 RX ADMIN — HYDROMORPHONE HYDROCHLORIDE 0.5 MILLIGRAM(S): 2 INJECTION INTRAMUSCULAR; INTRAVENOUS; SUBCUTANEOUS at 20:35

## 2019-09-10 RX ADMIN — HYDROMORPHONE HYDROCHLORIDE 0.5 MILLIGRAM(S): 2 INJECTION INTRAMUSCULAR; INTRAVENOUS; SUBCUTANEOUS at 16:33

## 2019-09-10 RX ADMIN — HYDROMORPHONE HYDROCHLORIDE 0.5 MILLIGRAM(S): 2 INJECTION INTRAMUSCULAR; INTRAVENOUS; SUBCUTANEOUS at 20:17

## 2019-09-10 NOTE — PROGRESS NOTE ADULT - SUBJECTIVE AND OBJECTIVE BOX
Post-op check:    s/p left hemithyroidectomy    Pt seen and examined at bedside in PACU.  No intraoperative complications.  Persistently hypertensive post-operatively.  Denies upper extremity or dona-oral paresthesias.    PE:  NAD, alert and appropriately responsive  non-labored breathing on RA  neck soft and flat, no hematoma  incision c/d/i, ZACK in place holding bulb suction    A/P:  63M s/p left hemithyroidectomy for compressive multinodular goiter  -admit to regional  -monitor ZACK output  -resume home anti-hypertensives  -diet  -SCDs, oob ad jun  -ancef x3 doses  -IS  -CPAP prn overnight  -Call ENT with questions/concerns

## 2019-09-11 LAB
ANION GAP SERPL CALC-SCNC: 12 MMOL/L — SIGNIFICANT CHANGE UP (ref 5–17)
APTT BLD: 28.3 SEC — SIGNIFICANT CHANGE UP (ref 27.5–36.3)
BASE EXCESS BLDA CALC-SCNC: -4.1 MMOL/L — LOW (ref -2–3)
BUN SERPL-MCNC: 20 MG/DL — SIGNIFICANT CHANGE UP (ref 7–23)
CALCIUM SERPL-MCNC: 9.1 MG/DL — SIGNIFICANT CHANGE UP (ref 8.4–10.5)
CHLORIDE SERPL-SCNC: 101 MMOL/L — SIGNIFICANT CHANGE UP (ref 96–108)
CO2 SERPL-SCNC: 22 MMOL/L — SIGNIFICANT CHANGE UP (ref 22–31)
CREAT SERPL-MCNC: 1.38 MG/DL — HIGH (ref 0.5–1.3)
GLUCOSE BLDC GLUCOMTR-MCNC: 142 MG/DL — HIGH (ref 70–99)
GLUCOSE SERPL-MCNC: 177 MG/DL — HIGH (ref 70–99)
HCO3 BLDA-SCNC: 20 MMOL/L — LOW (ref 21–28)
HCT VFR BLD CALC: 27.4 % — LOW (ref 39–50)
HGB BLD-MCNC: 8.9 G/DL — LOW (ref 13–17)
INR BLD: 1.21 — HIGH (ref 0.88–1.16)
MAGNESIUM SERPL-MCNC: 1.5 MG/DL — LOW (ref 1.6–2.6)
MCHC RBC-ENTMCNC: 26 PG — LOW (ref 27–34)
MCHC RBC-ENTMCNC: 32.5 GM/DL — SIGNIFICANT CHANGE UP (ref 32–36)
MCV RBC AUTO: 80.1 FL — SIGNIFICANT CHANGE UP (ref 80–100)
NRBC # BLD: 0 /100 WBCS — SIGNIFICANT CHANGE UP (ref 0–0)
PCO2 BLDA: 31 MMHG — LOW (ref 35–48)
PH BLDA: 7.42 — SIGNIFICANT CHANGE UP (ref 7.35–7.45)
PHOSPHATE SERPL-MCNC: 3.7 MG/DL — SIGNIFICANT CHANGE UP (ref 2.5–4.5)
PLATELET # BLD AUTO: 206 K/UL — SIGNIFICANT CHANGE UP (ref 150–400)
PO2 BLDA: 200 MMHG — HIGH (ref 83–108)
POTASSIUM SERPL-MCNC: 4.3 MMOL/L — SIGNIFICANT CHANGE UP (ref 3.5–5.3)
POTASSIUM SERPL-SCNC: 4.3 MMOL/L — SIGNIFICANT CHANGE UP (ref 3.5–5.3)
PROTHROM AB SERPL-ACNC: 13.7 SEC — HIGH (ref 10–12.9)
RBC # BLD: 3.42 M/UL — LOW (ref 4.2–5.8)
RBC # FLD: 14.6 % — HIGH (ref 10.3–14.5)
SAO2 % BLDA: 99 % — SIGNIFICANT CHANGE UP (ref 95–100)
SODIUM SERPL-SCNC: 135 MMOL/L — SIGNIFICANT CHANGE UP (ref 135–145)
WBC # BLD: 6.88 K/UL — SIGNIFICANT CHANGE UP (ref 3.8–10.5)
WBC # FLD AUTO: 6.88 K/UL — SIGNIFICANT CHANGE UP (ref 3.8–10.5)

## 2019-09-11 PROCEDURE — 60210 PARTIAL THYROID EXCISION: CPT

## 2019-09-11 PROCEDURE — 95865 NEEDLE EMG LARYNX: CPT | Mod: 26

## 2019-09-11 PROCEDURE — 99223 1ST HOSP IP/OBS HIGH 75: CPT | Mod: GC

## 2019-09-11 PROCEDURE — 60271 REMOVAL OF THYROID: CPT

## 2019-09-11 PROCEDURE — 71045 X-RAY EXAM CHEST 1 VIEW: CPT | Mod: 26

## 2019-09-11 PROCEDURE — 60220 PARTIAL REMOVAL OF THYROID: CPT | Mod: 59

## 2019-09-11 RX ORDER — CARVEDILOL PHOSPHATE 80 MG/1
6.25 CAPSULE, EXTENDED RELEASE ORAL EVERY 12 HOURS
Refills: 0 | Status: DISCONTINUED | OUTPATIENT
Start: 2019-09-11 | End: 2019-09-11

## 2019-09-11 RX ORDER — HYDROMORPHONE HYDROCHLORIDE 2 MG/ML
1 INJECTION INTRAMUSCULAR; INTRAVENOUS; SUBCUTANEOUS EVERY 4 HOURS
Refills: 0 | Status: DISCONTINUED | OUTPATIENT
Start: 2019-09-11 | End: 2019-09-13

## 2019-09-11 RX ORDER — SODIUM CHLORIDE 9 MG/ML
1000 INJECTION, SOLUTION INTRAVENOUS
Refills: 0 | Status: DISCONTINUED | OUTPATIENT
Start: 2019-09-11 | End: 2019-09-12

## 2019-09-11 RX ORDER — FENTANYL CITRATE 50 UG/ML
0.5 INJECTION INTRAVENOUS
Qty: 2500 | Refills: 0 | Status: DISCONTINUED | OUTPATIENT
Start: 2019-09-11 | End: 2019-09-11

## 2019-09-11 RX ORDER — ACETAMINOPHEN 500 MG
1000 TABLET ORAL ONCE
Refills: 0 | Status: COMPLETED | OUTPATIENT
Start: 2019-09-11 | End: 2019-09-11

## 2019-09-11 RX ORDER — NICARDIPINE HYDROCHLORIDE 30 MG/1
5 CAPSULE, EXTENDED RELEASE ORAL
Qty: 40 | Refills: 0 | Status: DISCONTINUED | OUTPATIENT
Start: 2019-09-11 | End: 2019-09-11

## 2019-09-11 RX ORDER — HYDRALAZINE HCL 50 MG
10 TABLET ORAL EVERY 6 HOURS
Refills: 0 | Status: DISCONTINUED | OUTPATIENT
Start: 2019-09-11 | End: 2019-09-11

## 2019-09-11 RX ORDER — HYDROMORPHONE HYDROCHLORIDE 2 MG/ML
0.5 INJECTION INTRAMUSCULAR; INTRAVENOUS; SUBCUTANEOUS EVERY 4 HOURS
Refills: 0 | Status: DISCONTINUED | OUTPATIENT
Start: 2019-09-11 | End: 2019-09-13

## 2019-09-11 RX ORDER — HYDRALAZINE HCL 50 MG
10 TABLET ORAL ONCE
Refills: 0 | Status: COMPLETED | OUTPATIENT
Start: 2019-09-11 | End: 2019-09-11

## 2019-09-11 RX ORDER — CHLORHEXIDINE GLUCONATE 213 G/1000ML
15 SOLUTION TOPICAL EVERY 12 HOURS
Refills: 0 | Status: DISCONTINUED | OUTPATIENT
Start: 2019-09-11 | End: 2019-09-15

## 2019-09-11 RX ORDER — MAGNESIUM SULFATE 500 MG/ML
2 VIAL (ML) INJECTION ONCE
Refills: 0 | Status: COMPLETED | OUTPATIENT
Start: 2019-09-11 | End: 2019-09-11

## 2019-09-11 RX ORDER — LABETALOL HCL 100 MG
10 TABLET ORAL EVERY 6 HOURS
Refills: 0 | Status: DISCONTINUED | OUTPATIENT
Start: 2019-09-11 | End: 2019-09-11

## 2019-09-11 RX ORDER — PANTOPRAZOLE SODIUM 20 MG/1
40 TABLET, DELAYED RELEASE ORAL
Refills: 0 | Status: DISCONTINUED | OUTPATIENT
Start: 2019-09-11 | End: 2019-09-12

## 2019-09-11 RX ORDER — PROPOFOL 10 MG/ML
10 INJECTION, EMULSION INTRAVENOUS
Qty: 1000 | Refills: 0 | Status: DISCONTINUED | OUTPATIENT
Start: 2019-09-11 | End: 2019-09-11

## 2019-09-11 RX ORDER — AMLODIPINE BESYLATE 2.5 MG/1
10 TABLET ORAL DAILY
Refills: 0 | Status: DISCONTINUED | OUTPATIENT
Start: 2019-09-11 | End: 2019-09-11

## 2019-09-11 RX ORDER — LABETALOL HCL 100 MG
10 TABLET ORAL ONCE
Refills: 0 | Status: DISCONTINUED | OUTPATIENT
Start: 2019-09-11 | End: 2019-09-11

## 2019-09-11 RX ORDER — LABETALOL HCL 100 MG
10 TABLET ORAL
Refills: 0 | Status: DISCONTINUED | OUTPATIENT
Start: 2019-09-11 | End: 2019-09-13

## 2019-09-11 RX ORDER — CHLORHEXIDINE GLUCONATE 213 G/1000ML
1 SOLUTION TOPICAL
Refills: 0 | Status: DISCONTINUED | OUTPATIENT
Start: 2019-09-11 | End: 2019-09-15

## 2019-09-11 RX ORDER — FAMOTIDINE 10 MG/ML
20 INJECTION INTRAVENOUS ONCE
Refills: 0 | Status: COMPLETED | OUTPATIENT
Start: 2019-09-11 | End: 2019-09-11

## 2019-09-11 RX ORDER — HYDRALAZINE HCL 50 MG
10 TABLET ORAL EVERY 6 HOURS
Refills: 0 | Status: DISCONTINUED | OUTPATIENT
Start: 2019-09-11 | End: 2019-09-12

## 2019-09-11 RX ORDER — FENTANYL CITRATE 50 UG/ML
25 INJECTION INTRAVENOUS ONCE
Refills: 0 | Status: DISCONTINUED | OUTPATIENT
Start: 2019-09-11 | End: 2019-09-11

## 2019-09-11 RX ADMIN — Medication 100 MILLIGRAM(S): at 07:41

## 2019-09-11 RX ADMIN — Medication 400 MILLIGRAM(S): at 11:00

## 2019-09-11 RX ADMIN — Medication 10 MILLIGRAM(S): at 10:30

## 2019-09-11 RX ADMIN — SODIUM CHLORIDE 120 MILLILITER(S): 9 INJECTION, SOLUTION INTRAVENOUS at 13:31

## 2019-09-11 RX ADMIN — FAMOTIDINE 20 MILLIGRAM(S): 10 INJECTION INTRAVENOUS at 13:00

## 2019-09-11 RX ADMIN — Medication 1000 MILLIGRAM(S): at 11:33

## 2019-09-11 RX ADMIN — PROPOFOL 5.01 MICROGRAM(S)/KG/MIN: 10 INJECTION, EMULSION INTRAVENOUS at 07:46

## 2019-09-11 RX ADMIN — HYDROMORPHONE HYDROCHLORIDE 1 MILLIGRAM(S): 2 INJECTION INTRAMUSCULAR; INTRAVENOUS; SUBCUTANEOUS at 20:15

## 2019-09-11 RX ADMIN — Medication 10 MILLIGRAM(S): at 17:48

## 2019-09-11 RX ADMIN — Medication 10 MILLIGRAM(S): at 15:02

## 2019-09-11 RX ADMIN — HYDROMORPHONE HYDROCHLORIDE 0.5 MILLIGRAM(S): 2 INJECTION INTRAMUSCULAR; INTRAVENOUS; SUBCUTANEOUS at 16:00

## 2019-09-11 RX ADMIN — CHLORHEXIDINE GLUCONATE 15 MILLILITER(S): 213 SOLUTION TOPICAL at 07:42

## 2019-09-11 RX ADMIN — Medication 50 GRAM(S): at 10:03

## 2019-09-11 RX ADMIN — SODIUM CHLORIDE 120 MILLILITER(S): 9 INJECTION, SOLUTION INTRAVENOUS at 21:00

## 2019-09-11 RX ADMIN — Medication 10 MILLIGRAM(S): at 20:16

## 2019-09-11 RX ADMIN — HYDROMORPHONE HYDROCHLORIDE 0.5 MILLIGRAM(S): 2 INJECTION INTRAMUSCULAR; INTRAVENOUS; SUBCUTANEOUS at 17:48

## 2019-09-11 RX ADMIN — Medication 10 MILLIGRAM(S): at 10:00

## 2019-09-11 RX ADMIN — SODIUM CHLORIDE 120 MILLILITER(S): 9 INJECTION, SOLUTION INTRAVENOUS at 07:48

## 2019-09-11 RX ADMIN — HYDROMORPHONE HYDROCHLORIDE 0.5 MILLIGRAM(S): 2 INJECTION INTRAMUSCULAR; INTRAVENOUS; SUBCUTANEOUS at 13:26

## 2019-09-11 RX ADMIN — Medication 100 MILLIGRAM(S): at 13:31

## 2019-09-11 RX ADMIN — HYDROMORPHONE HYDROCHLORIDE 1 MILLIGRAM(S): 2 INJECTION INTRAMUSCULAR; INTRAVENOUS; SUBCUTANEOUS at 20:30

## 2019-09-11 RX ADMIN — HYDROMORPHONE HYDROCHLORIDE 0.5 MILLIGRAM(S): 2 INJECTION INTRAMUSCULAR; INTRAVENOUS; SUBCUTANEOUS at 01:35

## 2019-09-11 RX ADMIN — HYDROMORPHONE HYDROCHLORIDE 0.5 MILLIGRAM(S): 2 INJECTION INTRAMUSCULAR; INTRAVENOUS; SUBCUTANEOUS at 12:55

## 2019-09-11 RX ADMIN — HYDROMORPHONE HYDROCHLORIDE 0.5 MILLIGRAM(S): 2 INJECTION INTRAMUSCULAR; INTRAVENOUS; SUBCUTANEOUS at 01:50

## 2019-09-11 RX ADMIN — CHLORHEXIDINE GLUCONATE 1 APPLICATION(S): 213 SOLUTION TOPICAL at 07:43

## 2019-09-11 NOTE — H&P ADULT - ASSESSMENT
63M s/p left hemithyroidectomy for MNG with compressive symptoms. Postop course complicated by hypertension and expanding neck hematoma s/p neck exploration and evacuation of hematoma. Remains intubated, sedated. BP stable on nicardipine drip.  -will discuss plan for extubation with Dr Austin this AM  -BP control per SICU team  -please obtain medicine consult for BP management following transition from nicardipine drip  -Ancef x 7 days  -monitor ZAKC output  -Vent management per SICU  -Page ENT with further questions/concerns 63M s/p left hemithyroidectomy for MNG with compressive symptoms. Postop course complicated by hypertension and expanding neck hematoma s/p neck exploration and evacuation of hematoma. Remains intubated, sedated. BP stable on nicardipine drip.  -will discuss plan regarding timing for extubation with Dr Austin this AM  -BP control per SICU team  -please obtain medicine consult for BP management following transition from nicardipine drip  -Ancef x 7 days  -monitor ZACK output  -Vent management per SICU  -Page ENT with further questions/concerns

## 2019-09-11 NOTE — SWALLOW BEDSIDE ASSESSMENT ADULT - SLP PERTINENT HISTORY OF CURRENT PROBLEM
Pt is s/p left hemithyroidectomy for compressive multinodular goiter. Following surgery, pt was taken back to OR for awake intubation, neck exploration, evacuation of hematoma and control of bleeding. Now extubated

## 2019-09-11 NOTE — BRIEF OPERATIVE NOTE - NSICDXBRIEFPROCEDURE_GEN_ALL_CORE_FT
PROCEDURES:  Exploration, wound, neck 11-Sep-2019 05:54:01  Andres Mobley
PROCEDURES:  Hemithyroidectomy, left 10-Sep-2019 15:13:32  Rosa Parrish

## 2019-09-11 NOTE — H&P ADULT - HISTORY OF PRESENT ILLNESS
63M with hx of KHURRAM admitted for observation s/p left hemithyroidectomy for compressive MNG. Initial surgery was uncomplicated. Was noted to be hypertensive in PACU, poorly responded to home meds. Over the course of the evening, became increasingly dyspneic and developed increasing left neck fullness. Was subsequently taken back to OR for awake intubation, neck exploration, evacuation of hematoma and control of bleeding.     Was transferred to PACU intubated, in stable condition.  BP well controlled on nicardipine drip started in OR.

## 2019-09-11 NOTE — H&P ADULT - NSHPPHYSICALEXAM_GEN_ALL_CORE
Gen - intubated, sedated, NAD  Head - NCAT  Nose - ETT in right nare, secured with tape  Oc/OP - healthy oral mucosa  Neck - soft, flat,  incision c/d/i, ZACK x 1 in place with ss drain output

## 2019-09-11 NOTE — CONSULT NOTE ADULT - SUBJECTIVE AND OBJECTIVE BOX
Attending: [place]    HPI: 63M PMHx GERD, sleeve gastrectomy ([facility] 2017), admitted for compressive multinodular goiter, s/p left hemithyroidectomy, at 2am POD1 c/o difficulty breathing, found to have large neck hematoma, bedside laryngoscopy with significant airway obstruction, brought to OR emergently for washout.    PMH: as above  PSH: as above  Meds: unable to obtain 2/2 intubated  Allerg: NKDA  SH: unable to obtain 2/2 intubated  FH: unable to obtain 2/2 intubated    T(C): 36.1 (09-10-19 @ 15:30), Max: 36.1 (09-10-19 @ 15:30)  HR: 84 (09-11-19 @ 02:30) (58 - 84)  BP: 164/93 (09-11-19 @ 02:30) (135/80 - 197/105)  RR: 16 (09-11-19 @ 02:30) (9 - 19)  SpO2: 99% (09-11-19 @ 02:30) (99% - 100%)    Physical Exam:    LABS:  pending postop labs      RADIOLOGY & ADDITIONAL STUDIES:  pending postop CXR    Assessment:  63M PMHx GERD, sleeve gastrectomy, admitted for compressive multinodular goiter, s/p left hemithyroidectomy (9/10), RTOR for evacuation of hematoma (9/11), admitted to SICU for HD monitoring.    Neuro: propofol/fentanyl  CV: SBP < 140, on nicardipine gtt  Resp: intubated  FENGI: NPO, IVF  : Frazier  ID: Ancef (9/10 - )  PPx: SCDs, no chemoppx  Wounds/lines: R radial chucho (9/11 - ), ZACK neck  PT: not ordered

## 2019-09-12 LAB
ANION GAP SERPL CALC-SCNC: 8 MMOL/L — SIGNIFICANT CHANGE UP (ref 5–17)
BASOPHILS # BLD AUTO: 0.02 K/UL — SIGNIFICANT CHANGE UP (ref 0–0.2)
BASOPHILS NFR BLD AUTO: 0.3 % — SIGNIFICANT CHANGE UP (ref 0–2)
BUN SERPL-MCNC: 18 MG/DL — SIGNIFICANT CHANGE UP (ref 7–23)
CALCIUM SERPL-MCNC: 9.2 MG/DL — SIGNIFICANT CHANGE UP (ref 8.4–10.5)
CHLORIDE SERPL-SCNC: 105 MMOL/L — SIGNIFICANT CHANGE UP (ref 96–108)
CO2 SERPL-SCNC: 26 MMOL/L — SIGNIFICANT CHANGE UP (ref 22–31)
CREAT SERPL-MCNC: 1.18 MG/DL — SIGNIFICANT CHANGE UP (ref 0.5–1.3)
EOSINOPHIL # BLD AUTO: 0.01 K/UL — SIGNIFICANT CHANGE UP (ref 0–0.5)
EOSINOPHIL NFR BLD AUTO: 0.1 % — SIGNIFICANT CHANGE UP (ref 0–6)
GLUCOSE SERPL-MCNC: 116 MG/DL — HIGH (ref 70–99)
HCT VFR BLD CALC: 26.2 % — LOW (ref 39–50)
HCV AB S/CO SERPL IA: 0.04 S/CO — SIGNIFICANT CHANGE UP
HCV AB SERPL-IMP: SIGNIFICANT CHANGE UP
HGB BLD-MCNC: 8.4 G/DL — LOW (ref 13–17)
IMM GRANULOCYTES NFR BLD AUTO: 0.4 % — SIGNIFICANT CHANGE UP (ref 0–1.5)
LYMPHOCYTES # BLD AUTO: 1.24 K/UL — SIGNIFICANT CHANGE UP (ref 1–3.3)
LYMPHOCYTES # BLD AUTO: 16.7 % — SIGNIFICANT CHANGE UP (ref 13–44)
MAGNESIUM SERPL-MCNC: 1.9 MG/DL — SIGNIFICANT CHANGE UP (ref 1.6–2.6)
MCHC RBC-ENTMCNC: 25.9 PG — LOW (ref 27–34)
MCHC RBC-ENTMCNC: 32.1 GM/DL — SIGNIFICANT CHANGE UP (ref 32–36)
MCV RBC AUTO: 80.9 FL — SIGNIFICANT CHANGE UP (ref 80–100)
MONOCYTES # BLD AUTO: 0.63 K/UL — SIGNIFICANT CHANGE UP (ref 0–0.9)
MONOCYTES NFR BLD AUTO: 8.5 % — SIGNIFICANT CHANGE UP (ref 2–14)
NEUTROPHILS # BLD AUTO: 5.49 K/UL — SIGNIFICANT CHANGE UP (ref 1.8–7.4)
NEUTROPHILS NFR BLD AUTO: 74 % — SIGNIFICANT CHANGE UP (ref 43–77)
NRBC # BLD: 0 /100 WBCS — SIGNIFICANT CHANGE UP (ref 0–0)
PHOSPHATE SERPL-MCNC: 3.4 MG/DL — SIGNIFICANT CHANGE UP (ref 2.5–4.5)
PLATELET # BLD AUTO: 204 K/UL — SIGNIFICANT CHANGE UP (ref 150–400)
POTASSIUM SERPL-MCNC: 4.1 MMOL/L — SIGNIFICANT CHANGE UP (ref 3.5–5.3)
POTASSIUM SERPL-SCNC: 4.1 MMOL/L — SIGNIFICANT CHANGE UP (ref 3.5–5.3)
RBC # BLD: 3.24 M/UL — LOW (ref 4.2–5.8)
RBC # FLD: 14.7 % — HIGH (ref 10.3–14.5)
SODIUM SERPL-SCNC: 139 MMOL/L — SIGNIFICANT CHANGE UP (ref 135–145)
SURGICAL PATHOLOGY STUDY: SIGNIFICANT CHANGE UP
WBC # BLD: 7.42 K/UL — SIGNIFICANT CHANGE UP (ref 3.8–10.5)
WBC # FLD AUTO: 7.42 K/UL — SIGNIFICANT CHANGE UP (ref 3.8–10.5)

## 2019-09-12 PROCEDURE — 99233 SBSQ HOSP IP/OBS HIGH 50: CPT | Mod: GC

## 2019-09-12 PROCEDURE — 93971 EXTREMITY STUDY: CPT | Mod: 26,LT

## 2019-09-12 RX ORDER — HYDROMORPHONE HYDROCHLORIDE 2 MG/ML
0.5 INJECTION INTRAMUSCULAR; INTRAVENOUS; SUBCUTANEOUS ONCE
Refills: 0 | Status: DISCONTINUED | OUTPATIENT
Start: 2019-09-12 | End: 2019-09-12

## 2019-09-12 RX ORDER — PANTOPRAZOLE SODIUM 20 MG/1
40 TABLET, DELAYED RELEASE ORAL DAILY
Refills: 0 | Status: DISCONTINUED | OUTPATIENT
Start: 2019-09-12 | End: 2019-09-13

## 2019-09-12 RX ORDER — MAGNESIUM SULFATE 500 MG/ML
1 VIAL (ML) INJECTION ONCE
Refills: 0 | Status: COMPLETED | OUTPATIENT
Start: 2019-09-12 | End: 2019-09-12

## 2019-09-12 RX ORDER — SODIUM CHLORIDE 9 MG/ML
1000 INJECTION, SOLUTION INTRAVENOUS
Refills: 0 | Status: DISCONTINUED | OUTPATIENT
Start: 2019-09-12 | End: 2019-09-14

## 2019-09-12 RX ORDER — LABETALOL HCL 100 MG
10 TABLET ORAL ONCE
Refills: 0 | Status: COMPLETED | OUTPATIENT
Start: 2019-09-12 | End: 2019-09-12

## 2019-09-12 RX ORDER — HYDRALAZINE HCL 50 MG
20 TABLET ORAL EVERY 6 HOURS
Refills: 0 | Status: DISCONTINUED | OUTPATIENT
Start: 2019-09-12 | End: 2019-09-13

## 2019-09-12 RX ORDER — INFLUENZA VIRUS VACCINE 15; 15; 15; 15 UG/.5ML; UG/.5ML; UG/.5ML; UG/.5ML
0.5 SUSPENSION INTRAMUSCULAR ONCE
Refills: 0 | Status: DISCONTINUED | OUTPATIENT
Start: 2019-09-12 | End: 2019-09-15

## 2019-09-12 RX ADMIN — Medication 10 MILLIGRAM(S): at 20:50

## 2019-09-12 RX ADMIN — HYDROMORPHONE HYDROCHLORIDE 0.5 MILLIGRAM(S): 2 INJECTION INTRAMUSCULAR; INTRAVENOUS; SUBCUTANEOUS at 02:58

## 2019-09-12 RX ADMIN — Medication 10 MILLIGRAM(S): at 09:01

## 2019-09-12 RX ADMIN — HYDROMORPHONE HYDROCHLORIDE 0.5 MILLIGRAM(S): 2 INJECTION INTRAMUSCULAR; INTRAVENOUS; SUBCUTANEOUS at 10:21

## 2019-09-12 RX ADMIN — HYDROMORPHONE HYDROCHLORIDE 0.5 MILLIGRAM(S): 2 INJECTION INTRAMUSCULAR; INTRAVENOUS; SUBCUTANEOUS at 09:17

## 2019-09-12 RX ADMIN — HYDROMORPHONE HYDROCHLORIDE 0.5 MILLIGRAM(S): 2 INJECTION INTRAMUSCULAR; INTRAVENOUS; SUBCUTANEOUS at 03:15

## 2019-09-12 RX ADMIN — SODIUM CHLORIDE 75 MILLILITER(S): 9 INJECTION, SOLUTION INTRAVENOUS at 09:02

## 2019-09-12 RX ADMIN — Medication 10 MILLIGRAM(S): at 15:02

## 2019-09-12 RX ADMIN — CHLORHEXIDINE GLUCONATE 15 MILLILITER(S): 213 SOLUTION TOPICAL at 06:05

## 2019-09-12 RX ADMIN — PANTOPRAZOLE SODIUM 40 MILLIGRAM(S): 20 TABLET, DELAYED RELEASE ORAL at 12:59

## 2019-09-12 RX ADMIN — CHLORHEXIDINE GLUCONATE 1 APPLICATION(S): 213 SOLUTION TOPICAL at 06:04

## 2019-09-12 RX ADMIN — Medication 20 MILLIGRAM(S): at 12:24

## 2019-09-12 RX ADMIN — HYDROMORPHONE HYDROCHLORIDE 1 MILLIGRAM(S): 2 INJECTION INTRAMUSCULAR; INTRAVENOUS; SUBCUTANEOUS at 04:55

## 2019-09-12 RX ADMIN — Medication 10 MILLIGRAM(S): at 22:18

## 2019-09-12 RX ADMIN — SODIUM CHLORIDE 75 MILLILITER(S): 9 INJECTION, SOLUTION INTRAVENOUS at 15:16

## 2019-09-12 RX ADMIN — HYDROMORPHONE HYDROCHLORIDE 1 MILLIGRAM(S): 2 INJECTION INTRAMUSCULAR; INTRAVENOUS; SUBCUTANEOUS at 05:10

## 2019-09-12 RX ADMIN — HYDROMORPHONE HYDROCHLORIDE 0.5 MILLIGRAM(S): 2 INJECTION INTRAMUSCULAR; INTRAVENOUS; SUBCUTANEOUS at 11:00

## 2019-09-12 RX ADMIN — CHLORHEXIDINE GLUCONATE 15 MILLILITER(S): 213 SOLUTION TOPICAL at 17:18

## 2019-09-12 RX ADMIN — HYDROMORPHONE HYDROCHLORIDE 1 MILLIGRAM(S): 2 INJECTION INTRAMUSCULAR; INTRAVENOUS; SUBCUTANEOUS at 21:02

## 2019-09-12 RX ADMIN — HYDROMORPHONE HYDROCHLORIDE 1 MILLIGRAM(S): 2 INJECTION INTRAMUSCULAR; INTRAVENOUS; SUBCUTANEOUS at 14:21

## 2019-09-12 RX ADMIN — HYDROMORPHONE HYDROCHLORIDE 0.5 MILLIGRAM(S): 2 INJECTION INTRAMUSCULAR; INTRAVENOUS; SUBCUTANEOUS at 15:49

## 2019-09-12 RX ADMIN — HYDROMORPHONE HYDROCHLORIDE 0.5 MILLIGRAM(S): 2 INJECTION INTRAMUSCULAR; INTRAVENOUS; SUBCUTANEOUS at 10:10

## 2019-09-12 RX ADMIN — Medication 100 GRAM(S): at 09:01

## 2019-09-12 RX ADMIN — HYDROMORPHONE HYDROCHLORIDE 1 MILLIGRAM(S): 2 INJECTION INTRAMUSCULAR; INTRAVENOUS; SUBCUTANEOUS at 20:50

## 2019-09-12 RX ADMIN — Medication 10 MILLIGRAM(S): at 18:30

## 2019-09-12 RX ADMIN — Medication 10 MILLIGRAM(S): at 07:10

## 2019-09-12 RX ADMIN — HYDROMORPHONE HYDROCHLORIDE 0.5 MILLIGRAM(S): 2 INJECTION INTRAMUSCULAR; INTRAVENOUS; SUBCUTANEOUS at 15:09

## 2019-09-12 RX ADMIN — HYDROMORPHONE HYDROCHLORIDE 1 MILLIGRAM(S): 2 INJECTION INTRAMUSCULAR; INTRAVENOUS; SUBCUTANEOUS at 12:54

## 2019-09-12 RX ADMIN — Medication 20 MILLIGRAM(S): at 18:17

## 2019-09-12 RX ADMIN — Medication 10 MILLIGRAM(S): at 02:58

## 2019-09-12 RX ADMIN — Medication 10 MILLIGRAM(S): at 01:05

## 2019-09-12 NOTE — PROGRESS NOTE ADULT - SUBJECTIVE AND OBJECTIVE BOX
Otolaryngology - Head & Neck Surgery Progress Note    Brief HPI  63M with hx of KHURRAM admitted for observation s/p left hemithyroidectomy for compressive MNG.     Hospital Course  9/10: Patient admitted for L hemithyroidectomy. Initial surgery was uncomplicated. Was noted to be hypertensive in PACU, poorly responded to home meds. Over the course of the evening, became increasingly dyspneic and developed increasing left neck fullness. Was subsequently taken back to OR for awake intubation, neck exploration, evacuation of hematoma and control of bleeding. Was transferred to PACU intubated, in stable condition.  BP well controlled on nicardipine drip started in OR.  9/11: NAEON, remained on cardene gtt in am, extubated successfully. Failed SLP eval and po trial, remained NPO. Voice improved. Changed to labetalol/hydralazine alternating, dc cardene gtt. Remained on bedrest.  9/12: NAEON, remains on alternating hydralazine/labetalol pending SLP evaluation for adv diet and po BP medication. Remained with frye and in bed in am.    MEDICATIONS  (STANDING):  chlorhexidine 0.12% Liquid 15 milliLiter(s) Oral Mucosa every 12 hours  chlorhexidine 4% Liquid 1 Application(s) Topical <User Schedule>  labetalol Injectable 10 milliGRAM(s) IV Push <User Schedule>  lactated ringers. 1000 milliLiter(s) (75 mL/Hr) IV Continuous <Continuous>  pantoprazole    Tablet 40 milliGRAM(s) Oral before breakfast    MEDICATIONS  (PRN):  hydrALAZINE Injectable 20 milliGRAM(s) IV Push every 6 hours PRN SBP> 140  HYDROmorphone  Injectable 0.5 milliGRAM(s) IV Push every 4 hours PRN Moderate Pain (4 - 6)  HYDROmorphone  Injectable 1 milliGRAM(s) IV Push every 4 hours PRN Severe Pain (7 - 10)    Vital Signs Last 24 Hrs  T(C): 37.7 (12 Sep 2019 04:52), Max: 37.7 (12 Sep 2019 04:52)  T(F): 99.9 (12 Sep 2019 04:52), Max: 99.9 (12 Sep 2019 04:52)  HR: 68 (12 Sep 2019 09:00) (48 - 78)  BP: 150/73 (12 Sep 2019 09:00) (95/54 - 157/74)  BP(mean): 105 (12 Sep 2019 09:00) (75 - 121)  RR: 13 (12 Sep 2019 09:00) (4 - 22)  SpO2: 98% (12 Sep 2019 09:00) (97% - 100%)    Physical Exam  NAD, A&Ox3  Nonlabored breathing on RA, tolerating secretions  No stridor or stertor  Voice mildly hoarse  Face symmetric  Neck supple, soft flat no ballotable collection  Neck incisions c/d/i, ZACK to R neck draining ss output      Labs/Imaging                        8.4    7.42  )-----------( 204      ( 12 Sep 2019 06:34 )             26.2     09-12    139  |  105  |  18  ----------------------------<  116<H>  4.1   |  26  |  1.18    Ca    9.2      12 Sep 2019 06:33  Phos  3.4     09-12  Mg     1.9     09-12      A/P:  63M s/p left hemithyroidectomy for MNG with compressive symptoms. Postop course complicated by hypertension and expanding neck hematoma s/p neck exploration and evacuation of hematoma. Extubated successfully, however failed SLP eval. Remains with requirement of labetalol/hydralazine. SLP eval today for adv diet and trial of po BP meds.  - remain SICU care until tolerating po BP meds  -BP control per SICU team  -please obtain medicine consult for BP management following transition from nicardipine drip  -Ancef x 7 days  -monitor ZACK output  -Page ENT with further questions/concerns

## 2019-09-12 NOTE — SWALLOW BEDSIDE ASSESSMENT ADULT - COMMENTS
Initial evaluation yesterday, 9/11, while still recovering in the PACU. Recommendations at that time were for continued NPO d/t complaints of moderate-severe odynophagia, decreased secretion management, inconsistent overt s/s of aspiration, and stasis.

## 2019-09-12 NOTE — PROGRESS NOTE ADULT - SUBJECTIVE AND OBJECTIVE BOX
Interval Events:  No events overnight   Patient seen and examined at bedside.      Allergies    No Known Allergies    Intolerances        Vital Signs Last 24 Hrs  T(C): 37.7 (12 Sep 2019 04:52), Max: 37.7 (12 Sep 2019 04:52)  T(F): 99.9 (12 Sep 2019 04:52), Max: 99.9 (12 Sep 2019 04:52)  HR: 64 (12 Sep 2019 06:00) (48 - 78)  BP: 147/74 (12 Sep 2019 06:00) (90/51 - 157/74)  BP(mean): 104 (12 Sep 2019 06:00) (63 - 121)  RR: 12 (12 Sep 2019 06:00) (4 - 22)  SpO2: 98% (12 Sep 2019 06:00) (97% - 100%)    09-10 @ 07:01  -  09-11 @ 07:00  --------------------------------------------------------  IN: 1933 mL / OUT: 820 mL / NET: 1113 mL    09-11 @ 07:01 - 09-12 @ 06:51  --------------------------------------------------------  IN: 2948 mL / OUT: 1798 mL / NET: 1150 mL      09-10 @ 07:01 - 09-11 @ 07:00  --------------------------------------------------------  IN: 1933 mL / OUT: 820 mL / NET: 1113 mL    09-11 @ 07:01 - 09-12 @ 06:51  --------------------------------------------------------  IN: 2948 mL / OUT: 1798 mL / NET: 1150 mL        Physical Exam:     Gen: NAD well nourished  HEENT: neck - supple/ soft incisions c/d/i, no hematoma ZACK ss  Neuro: A&OX3 No deficits  CV:RRR Reg s1s2 noM  Pulm: CTA b/l No w/r/r  Abd: Soft NT ND + BS  Ext: No C/C/E   Vasc: + DP b/l   Skin: no rashes noted  MSK: No joint swelling  Psych: No signs of anxiety or depression      LABS:  ABG - ( 11 Sep 2019 06:50 )  pH, Arterial: 7.42  pH, Blood: x     /  pCO2: 31    /  pO2: 200   / HCO3: 20    / Base Excess: -4.1  /  SaO2: 99                  CBC Full  -  ( 11 Sep 2019 06:55 )  WBC Count : 6.88 K/uL  RBC Count : 3.42 M/uL  Hemoglobin : 8.9 g/dL  Hematocrit : 27.4 %  Platelet Count - Automated : 206 K/uL  Mean Cell Volume : 80.1 fl  Mean Cell Hemoglobin : 26.0 pg  Mean Cell Hemoglobin Concentration : 32.5 gm/dL  Auto Neutrophil # : x  Auto Lymphocyte # : x  Auto Monocyte # : x  Auto Eosinophil # : x  Auto Basophil # : x  Auto Neutrophil % : x  Auto Lymphocyte % : x  Auto Monocyte % : x  Auto Eosinophil % : x  Auto Basophil % : x    09-11    135  |  101  |  20  ----------------------------<  177<H>  4.3   |  22  |  1.38<H>    Ca    9.1      11 Sep 2019 06:55  Phos  3.7     09-11  Mg     1.5     09-11      PT/INR - ( 11 Sep 2019 06:55 )   PT: 13.7 sec;   INR: 1.21          PTT - ( 11 Sep 2019 06:55 )  PTT:28.3 sec                RADIOLOGY & ADDITIONAL STUDIES (The following images were personally reviewed):          A/p: 63M PMHx GERD, sleeve gastrectomy, admitted for compressive multinodular goiter, s/p left hemithyroidectomy (9/10), RTOR for evacuation of hematoma (9/11), admitted to SICU for HD monitoring.    Neuro: Dilaudid PRN   CV: SBP < 140, standing Labetalol and Hydralazine Q6H will start PO meds if pt passes dysphagia screen   Resp: satting well on NC  FENGI: NPO except meds,  Pending Sp& sw   : Frazier Coude placed 9/11   ID: Ancef (9/10- )  Endo: FS wnl no need for ISS.   PPx: SCDs, no chemoppx  Wounds/lines: R radial chucho (9/11 - ), ZACK neck  PT: not ordered Interval Events:  No events overnight   Patient seen and examined at bedside.      Allergies    No Known Allergies    Intolerances        Vital Signs Last 24 Hrs  T(C): 37.7 (12 Sep 2019 04:52), Max: 37.7 (12 Sep 2019 04:52)  T(F): 99.9 (12 Sep 2019 04:52), Max: 99.9 (12 Sep 2019 04:52)  HR: 64 (12 Sep 2019 06:00) (48 - 78)  BP: 147/74 (12 Sep 2019 06:00) (90/51 - 157/74)  BP(mean): 104 (12 Sep 2019 06:00) (63 - 121)  RR: 12 (12 Sep 2019 06:00) (4 - 22)  SpO2: 98% (12 Sep 2019 06:00) (97% - 100%)    09-10 @ 07:01  -  09-11 @ 07:00  --------------------------------------------------------  IN: 1933 mL / OUT: 820 mL / NET: 1113 mL    09-11 @ 07:01 - 09-12 @ 06:51  --------------------------------------------------------  IN: 2948 mL / OUT: 1798 mL / NET: 1150 mL      09-10 @ 07:01 - 09-11 @ 07:00  --------------------------------------------------------  IN: 1933 mL / OUT: 820 mL / NET: 1113 mL    09-11 @ 07:01 - 09-12 @ 06:51  --------------------------------------------------------  IN: 2948 mL / OUT: 1798 mL / NET: 1150 mL        Physical Exam:     Gen: NAD well nourished  HEENT: neck - supple/ soft incisions c/d/i, no hematoma ZACK ss  Neuro: A&OX3 No deficits  CV:RRR Reg s1s2 noM  Pulm: CTA b/l No w/r/r  Abd: Soft NT ND + BS  Ext: No C/C/E   Vasc: + DP b/l   Skin: no rashes noted  MSK: No joint swelling  Psych: No signs of anxiety or depression      LABS:  ABG - ( 11 Sep 2019 06:50 )  pH, Arterial: 7.42  pH, Blood: x     /  pCO2: 31    /  pO2: 200   / HCO3: 20    / Base Excess: -4.1  /  SaO2: 99                  CBC Full  -  ( 11 Sep 2019 06:55 )  WBC Count : 6.88 K/uL  RBC Count : 3.42 M/uL  Hemoglobin : 8.9 g/dL  Hematocrit : 27.4 %  Platelet Count - Automated : 206 K/uL  Mean Cell Volume : 80.1 fl  Mean Cell Hemoglobin : 26.0 pg  Mean Cell Hemoglobin Concentration : 32.5 gm/dL  Auto Neutrophil # : x  Auto Lymphocyte # : x  Auto Monocyte # : x  Auto Eosinophil # : x  Auto Basophil # : x  Auto Neutrophil % : x  Auto Lymphocyte % : x  Auto Monocyte % : x  Auto Eosinophil % : x  Auto Basophil % : x    09-11    135  |  101  |  20  ----------------------------<  177<H>  4.3   |  22  |  1.38<H>    Ca    9.1      11 Sep 2019 06:55  Phos  3.7     09-11  Mg     1.5     09-11      PT/INR - ( 11 Sep 2019 06:55 )   PT: 13.7 sec;   INR: 1.21          PTT - ( 11 Sep 2019 06:55 )  PTT:28.3 sec                RADIOLOGY & ADDITIONAL STUDIES (The following images were personally reviewed):          A/p: 63M PMHx GERD, sleeve gastrectomy, admitted for compressive multinodular goiter, s/p left hemithyroidectomy (9/10), RTOR for evacuation of hematoma (9/11), admitted to SICU for HD monitoring.    Neuro: Dilaudid PRN   CV: SBP < 140, standing Labetalol and Hydralazine increased to 20 Q6H PRN will start PO meds if pt passes dysphagia screen LR decreased to 75  Resp: satting well on NC  FENGI: NPO except meds,  Pending Sp& sw   : Frazier Coude placed 9/11 to be removed today.   ID: Ancef (9/10- )  Endo: FS wnl no need for ISS.   PPx: SCDs, no chemoppx  Wounds/lines: R radial chucho (9/11 - ), ZACK neck  PT: Ordered   Dispo: Poss sdu Today

## 2019-09-12 NOTE — SWALLOW BEDSIDE ASSESSMENT ADULT - CONSISTENCIES ADMINISTERED
thin liquid/puree/thins: 1/2 + 1/4 tsp x2, puree: 1/4 tsp
ice chip, 1/2 tsp thin liquid x2/thin liquid

## 2019-09-12 NOTE — SWALLOW BEDSIDE ASSESSMENT ADULT - SLP PERTINENT HISTORY OF CURRENT PROBLEM
S/p L hemithyroidectomy for compressing MNG. Pt was taken back to the OR following surgery for awake intubation, neck exploration, evacuation of hematoma and control of bleeding.

## 2019-09-12 NOTE — SWALLOW BEDSIDE ASSESSMENT ADULT - MUCOSAL QUALITY
Pt reported occasional expectoration of blood-tinged secretions s/p surgery. Tc at bedside.
Pt reported frequent self-suctioning (every ~15-20 min.) of thick blood tinged secretions.

## 2019-09-12 NOTE — SWALLOW BEDSIDE ASSESSMENT ADULT - SWALLOW EVAL: DIAGNOSIS
Suspect at least moderate dysphagia s/p left hemithyroidectomy. Based on complaints of odynophagia, reduced secretion management, pharyngeal stasis with multiple secondary swallows, and inconsistent overt s/s of aspiration, PO is premature at this time. This service will continue to follow for ongoing reassessment to determine readiness for a PO diet.
Suspect at least moderate pharyngreal dysphagia AEB complaints of odynophagia and stasis, increased WOB, and overt s/s of aspiration with expectoration of copious thick brown/blood-tinged secretions. Trials were terminated d/t the severity of pain reported. No change in pt's clinical presentation compared to yesterday. In fact, pt believes swallow function has worsened from yesterday. Consequently, PO is still premature at this time. This service will continue to follow for ongoing reassessment to determine readiness for PO and further SLP intervention.

## 2019-09-12 NOTE — PROGRESS NOTE ADULT - ATTENDING COMMENTS
Patient seen and examined with house-staff during bedside rounds.  Resident note read, including vitals, physical findings, laboratory data, and radiological reports.   Revisions included below.  Direct personal management at bed side and extensive interpretation of the data.  Plan was outlined and discussed in details with the housestaff.  Decision making of high complexity  Action taken for acute disease activity to reflect the level of care provided:  - medication reconciliation  - review laboratory data  pulmonary status is stable  deline and remove frye  BP is controlled  failed dysphagia  continue IV antihypertensive  Hb stable and acute blood loss

## 2019-09-12 NOTE — SWALLOW BEDSIDE ASSESSMENT ADULT - NS SPL SWALLOW CLINIC TRIAL FT
Bolus acceptance, containment, and formation were WFL. Pt presented with complaints of moderate to severe odynophagia with limited trials. Additional complaints of pharyngeal stasis following puree trial. Stasis remained despite multiple secondary swallows and a liquid wash. Pt presented with immediate and delayed throat clearing and coughing following liquid wash suggestive of aspiration.
Bolus acceptance and containment were WFL. Pt presented with immediate and delayed throat clearing with expectoration of copious thick brown/blood tinged secretions. Pt with complaints of moderate-severe odynophagia (today > yesterday) and significant stasis with limited trials provided. Increased WOB noted during and following trials.

## 2019-09-13 LAB
ANION GAP SERPL CALC-SCNC: 11 MMOL/L — SIGNIFICANT CHANGE UP (ref 5–17)
BUN SERPL-MCNC: 18 MG/DL — SIGNIFICANT CHANGE UP (ref 7–23)
CALCIUM SERPL-MCNC: 9.1 MG/DL — SIGNIFICANT CHANGE UP (ref 8.4–10.5)
CHLORIDE SERPL-SCNC: 105 MMOL/L — SIGNIFICANT CHANGE UP (ref 96–108)
CO2 SERPL-SCNC: 24 MMOL/L — SIGNIFICANT CHANGE UP (ref 22–31)
CREAT SERPL-MCNC: 1.08 MG/DL — SIGNIFICANT CHANGE UP (ref 0.5–1.3)
GLUCOSE SERPL-MCNC: 111 MG/DL — HIGH (ref 70–99)
HCT VFR BLD CALC: 26.8 % — LOW (ref 39–50)
HGB BLD-MCNC: 8.5 G/DL — LOW (ref 13–17)
MAGNESIUM SERPL-MCNC: 1.7 MG/DL — SIGNIFICANT CHANGE UP (ref 1.6–2.6)
MCHC RBC-ENTMCNC: 25.9 PG — LOW (ref 27–34)
MCHC RBC-ENTMCNC: 31.7 GM/DL — LOW (ref 32–36)
MCV RBC AUTO: 81.7 FL — SIGNIFICANT CHANGE UP (ref 80–100)
NRBC # BLD: 0 /100 WBCS — SIGNIFICANT CHANGE UP (ref 0–0)
PHOSPHATE SERPL-MCNC: 2.6 MG/DL — SIGNIFICANT CHANGE UP (ref 2.5–4.5)
PLATELET # BLD AUTO: 222 K/UL — SIGNIFICANT CHANGE UP (ref 150–400)
POTASSIUM SERPL-MCNC: 3.8 MMOL/L — SIGNIFICANT CHANGE UP (ref 3.5–5.3)
POTASSIUM SERPL-SCNC: 3.8 MMOL/L — SIGNIFICANT CHANGE UP (ref 3.5–5.3)
RBC # BLD: 3.28 M/UL — LOW (ref 4.2–5.8)
RBC # FLD: 15 % — HIGH (ref 10.3–14.5)
SODIUM SERPL-SCNC: 140 MMOL/L — SIGNIFICANT CHANGE UP (ref 135–145)
WBC # BLD: 6.72 K/UL — SIGNIFICANT CHANGE UP (ref 3.8–10.5)
WBC # FLD AUTO: 6.72 K/UL — SIGNIFICANT CHANGE UP (ref 3.8–10.5)

## 2019-09-13 PROCEDURE — 99233 SBSQ HOSP IP/OBS HIGH 50: CPT | Mod: GC

## 2019-09-13 RX ORDER — LABETALOL HCL 100 MG
10 TABLET ORAL ONCE
Refills: 0 | Status: COMPLETED | OUTPATIENT
Start: 2019-09-13 | End: 2019-09-13

## 2019-09-13 RX ORDER — OXYCODONE AND ACETAMINOPHEN 5; 325 MG/1; MG/1
1 TABLET ORAL EVERY 4 HOURS
Refills: 0 | Status: DISCONTINUED | OUTPATIENT
Start: 2019-09-13 | End: 2019-09-15

## 2019-09-13 RX ORDER — AMLODIPINE BESYLATE 2.5 MG/1
10 TABLET ORAL DAILY
Refills: 0 | Status: DISCONTINUED | OUTPATIENT
Start: 2019-09-13 | End: 2019-09-15

## 2019-09-13 RX ORDER — OXYCODONE AND ACETAMINOPHEN 5; 325 MG/1; MG/1
2 TABLET ORAL EVERY 6 HOURS
Refills: 0 | Status: DISCONTINUED | OUTPATIENT
Start: 2019-09-13 | End: 2019-09-15

## 2019-09-13 RX ORDER — LABETALOL HCL 100 MG
20 TABLET ORAL EVERY 6 HOURS
Refills: 0 | Status: DISCONTINUED | OUTPATIENT
Start: 2019-09-13 | End: 2019-09-13

## 2019-09-13 RX ORDER — PANTOPRAZOLE SODIUM 20 MG/1
40 TABLET, DELAYED RELEASE ORAL
Refills: 0 | Status: DISCONTINUED | OUTPATIENT
Start: 2019-09-13 | End: 2019-09-13

## 2019-09-13 RX ORDER — HYDRALAZINE HCL 50 MG
20 TABLET ORAL EVERY 6 HOURS
Refills: 0 | Status: DISCONTINUED | OUTPATIENT
Start: 2019-09-13 | End: 2019-09-13

## 2019-09-13 RX ORDER — HYDROMORPHONE HYDROCHLORIDE 2 MG/ML
0.5 INJECTION INTRAMUSCULAR; INTRAVENOUS; SUBCUTANEOUS ONCE
Refills: 0 | Status: DISCONTINUED | OUTPATIENT
Start: 2019-09-13 | End: 2019-09-13

## 2019-09-13 RX ORDER — HYDRALAZINE HCL 50 MG
25 TABLET ORAL EVERY 8 HOURS
Refills: 0 | Status: DISCONTINUED | OUTPATIENT
Start: 2019-09-13 | End: 2019-09-15

## 2019-09-13 RX ORDER — LABETALOL HCL 100 MG
20 TABLET ORAL ONCE
Refills: 0 | Status: COMPLETED | OUTPATIENT
Start: 2019-09-13 | End: 2019-09-13

## 2019-09-13 RX ORDER — ACETAMINOPHEN 500 MG
650 TABLET ORAL EVERY 6 HOURS
Refills: 0 | Status: DISCONTINUED | OUTPATIENT
Start: 2019-09-13 | End: 2019-09-15

## 2019-09-13 RX ORDER — MAGNESIUM SULFATE 500 MG/ML
2 VIAL (ML) INJECTION ONCE
Refills: 0 | Status: COMPLETED | OUTPATIENT
Start: 2019-09-13 | End: 2019-09-13

## 2019-09-13 RX ORDER — PANTOPRAZOLE SODIUM 20 MG/1
40 TABLET, DELAYED RELEASE ORAL DAILY
Refills: 0 | Status: DISCONTINUED | OUTPATIENT
Start: 2019-09-13 | End: 2019-09-15

## 2019-09-13 RX ORDER — POTASSIUM PHOSPHATE, MONOBASIC POTASSIUM PHOSPHATE, DIBASIC 236; 224 MG/ML; MG/ML
15 INJECTION, SOLUTION INTRAVENOUS ONCE
Refills: 0 | Status: COMPLETED | OUTPATIENT
Start: 2019-09-13 | End: 2019-09-13

## 2019-09-13 RX ORDER — LABETALOL HCL 100 MG
200 TABLET ORAL EVERY 8 HOURS
Refills: 0 | Status: DISCONTINUED | OUTPATIENT
Start: 2019-09-13 | End: 2019-09-15

## 2019-09-13 RX ADMIN — HYDROMORPHONE HYDROCHLORIDE 0.5 MILLIGRAM(S): 2 INJECTION INTRAMUSCULAR; INTRAVENOUS; SUBCUTANEOUS at 08:21

## 2019-09-13 RX ADMIN — Medication 20 MILLIGRAM(S): at 05:05

## 2019-09-13 RX ADMIN — SODIUM CHLORIDE 75 MILLILITER(S): 9 INJECTION, SOLUTION INTRAVENOUS at 19:02

## 2019-09-13 RX ADMIN — HYDROMORPHONE HYDROCHLORIDE 0.5 MILLIGRAM(S): 2 INJECTION INTRAMUSCULAR; INTRAVENOUS; SUBCUTANEOUS at 08:43

## 2019-09-13 RX ADMIN — CHLORHEXIDINE GLUCONATE 15 MILLILITER(S): 213 SOLUTION TOPICAL at 06:23

## 2019-09-13 RX ADMIN — Medication 650 MILLIGRAM(S): at 17:19

## 2019-09-13 RX ADMIN — Medication 200 MILLIGRAM(S): at 11:31

## 2019-09-13 RX ADMIN — PANTOPRAZOLE SODIUM 40 MILLIGRAM(S): 20 TABLET, DELAYED RELEASE ORAL at 06:23

## 2019-09-13 RX ADMIN — AMLODIPINE BESYLATE 10 MILLIGRAM(S): 2.5 TABLET ORAL at 11:16

## 2019-09-13 RX ADMIN — Medication 25 MILLIGRAM(S): at 23:10

## 2019-09-13 RX ADMIN — Medication 10 MILLIGRAM(S): at 02:11

## 2019-09-13 RX ADMIN — HYDROMORPHONE HYDROCHLORIDE 0.5 MILLIGRAM(S): 2 INJECTION INTRAMUSCULAR; INTRAVENOUS; SUBCUTANEOUS at 03:33

## 2019-09-13 RX ADMIN — Medication 10 MILLIGRAM(S): at 03:45

## 2019-09-13 RX ADMIN — HYDROMORPHONE HYDROCHLORIDE 0.5 MILLIGRAM(S): 2 INJECTION INTRAMUSCULAR; INTRAVENOUS; SUBCUTANEOUS at 03:29

## 2019-09-13 RX ADMIN — Medication 20 MILLIGRAM(S): at 07:09

## 2019-09-13 RX ADMIN — Medication 200 MILLIGRAM(S): at 19:02

## 2019-09-13 RX ADMIN — Medication 20 MILLIGRAM(S): at 00:15

## 2019-09-13 RX ADMIN — PANTOPRAZOLE SODIUM 40 MILLIGRAM(S): 20 TABLET, DELAYED RELEASE ORAL at 11:31

## 2019-09-13 RX ADMIN — Medication 25 MILLIGRAM(S): at 15:09

## 2019-09-13 RX ADMIN — Medication 50 GRAM(S): at 07:12

## 2019-09-13 RX ADMIN — Medication 10 MILLIGRAM(S): at 03:17

## 2019-09-13 RX ADMIN — POTASSIUM PHOSPHATE, MONOBASIC POTASSIUM PHOSPHATE, DIBASIC 42.5 MILLIMOLE(S): 236; 224 INJECTION, SOLUTION INTRAVENOUS at 10:54

## 2019-09-13 RX ADMIN — Medication 650 MILLIGRAM(S): at 18:15

## 2019-09-13 RX ADMIN — Medication 20 MILLIGRAM(S): at 09:16

## 2019-09-13 RX ADMIN — CHLORHEXIDINE GLUCONATE 15 MILLILITER(S): 213 SOLUTION TOPICAL at 17:19

## 2019-09-13 RX ADMIN — Medication 1 MILLIGRAM(S): at 04:46

## 2019-09-13 NOTE — DIETITIAN INITIAL EVALUATION ADULT. - DIET TYPE
no active diet order As medically feasible, advance diet as tolerated by pt towards regular diet w/ texture modification per SLP recommendation. Monitor PO intake, monitor need for addition of ONS.

## 2019-09-13 NOTE — DIETITIAN INITIAL EVALUATION ADULT. - PERTINENT LABORATORY DATA
9/13: hemoglobin 8.5, hematocrit 26.8, glucose 111, 8/22: cholesterol 122, triglycerides 50, HDL 47, LDL 65, HgbA1c 5.1%, POCT: 9/11: 142

## 2019-09-13 NOTE — PHYSICAL THERAPY INITIAL EVALUATION ADULT - PERTINENT HX OF CURRENT PROBLEM, REHAB EVAL
63M with hx of KHURRAM admitted for observation s/p left hemithyroidectomy for compressive MNG. Initial surgery was uncomplicated. Was noted to be hypertensive in PACU, poorly responded to home meds. Over the course of the evening, became increasingly dyspneic and developed increasing left neck fullness. Was subsequently taken back to OR for awake intubation, neck exploration, evacuation of hematoma and control of bleeding. Please refer to H&P on [place] for remaining.

## 2019-09-13 NOTE — PHYSICAL THERAPY INITIAL EVALUATION ADULT - GAIT DEVIATIONS NOTED, PT EVAL
decreased step length/decreased velocity of limb motion/decreased mariya/slightly unsteady gait, no LOB/knee buckling noted however increased time required to complete gait distance (~5 minutes)/decreased weight-shifting ability

## 2019-09-13 NOTE — PROGRESS NOTE ADULT - ASSESSMENT
A/p: 63M PMHx GERD, sleeve gastrectomy, admitted for compressive multinodular goiter, s/p left hemithyroidectomy (9/10), RTOR for evacuation of hematoma (9/11), admitted to SICU for HD monitoring.    Neuro: Dilaudid PRN   CV: goal SBP < 140, on standing Labetalol 20mg q6h IV until able to take PO's and Hydralazine IV 20 Q6H PRN. unable to give PO BP meds because he failed S&S yesterday.   Resp: no resp distress.   FENGI: failed S&S yesterday. keep NPO for now. gently IVF with LR@75  : voids.   ID: surgical ppx: Ancef (9/10- )  Endo: FS wnl no need for ISS.   PPx: SCDs, no SQL/SQH per ENT due to recent bleed.   Wounds/lines: R radial chucho (9/11 - ), ZACK neck  OOb to chair.

## 2019-09-13 NOTE — PHYSICAL THERAPY INITIAL EVALUATION ADULT - ADDITIONAL COMMENTS
Patient reports previously independent with all ADLs/IADLs prior to admission. No HHA. Denies history of mechanical falls s/p B/L TKAs. Has been driving to outpatient PT (although reports he was never cleared for driving). Ceased use of assistive devices although still owns RW and SC.

## 2019-09-13 NOTE — DIETITIAN INITIAL EVALUATION ADULT. - ENERGY NEEDS
Ht: 6'0", Wt: 83.5kg, IBW: 80.9kg, 103.2%IBW.   Needs calculated based on [facility] standards of care.

## 2019-09-13 NOTE — PHYSICAL THERAPY INITIAL EVALUATION ADULT - THERAPY FREQUENCY, PT EVAL
Patient educated on frequency of inpatient therapy at [facility], patient verbalized understanding./2-3x/week

## 2019-09-13 NOTE — PHYSICAL THERAPY INITIAL EVALUATION ADULT - GENERAL OBSERVATIONS, REHAB EVAL
Chart reviewed. IE Completed. Patient without complaints of pain at rest, agreeable to PT with encouragement. Patient received OOB in chair, NAD, +tele, +1 cervical ZACK intact, +IV hep aki, RN Dalia cleared patient for treatment.

## 2019-09-13 NOTE — PROGRESS NOTE ADULT - ATTENDING COMMENTS
Patient seen and examined with house-staff during bedside rounds.  Resident note read, including vitals, physical findings, laboratory data, and radiological reports.   Revisions included below.  Direct personal management at bed side and extensive interpretation of the data.  Plan was outlined and discussed in details with the housestaff.  Decision making of high complexity  Action taken for acute disease activity to reflect the level of care provided:  - medication reconciliation  - review laboratory data  Status post respiratory failure    Hypertension    Anemia 2 acute loss loss postoperatively.    Respiratory status is stable and no evidence of upper airway structure and no prior physician as tolerating p.o. and he passed his swallow evaluation    Hemoglobin is stable    Patient was started on antihypertensive medication the blood pressure is better controlled

## 2019-09-13 NOTE — PROGRESS NOTE ADULT - SUBJECTIVE AND OBJECTIVE BOX
S: pt felt like theres a lot of secretions in his throat causing a choking sensation last night, but this morning, pt says he feels much better.   otherwise No new issues/events overnight, no new med c/o    O: ICU Vital Signs Last 24 Hrs  T(F): 98.3 (09-13-19 @ 08:55), Max: 99.8 (09-13-19 @ 04:53)  HR: 80 (09-13-19 @ 09:00) (60 - 88)  BP: 177/83 (09-13-19 @ 09:00) (129/68 - 177/84)  BP(mean): 119 (09-13-19 @ 09:00) (92 - 120)  ABP: 168/62 (09-12-19 @ 10:00)  RR: 11 (09-13-19 @ 09:00) (9 - 27)  SpO2: 98% (09-13-19 @ 09:00) (93% - 100%)    PHYSICAL EXAM:     Neurological: AAOx3, CNII-XII intact,  strength 5/5 b/l  ENT: ant neck incision clean, no erythema, no pus. ZACK serosang.   Cardiovascular: RRR  Respiratory: CTA  Gastrointestinal: soft, NT, ND, BS+  Extremities: warm, no dependent edema  Vascular: no cyanosis/erythema    LABS:    09-13    140  |  105  |  18  ----------------------------<  111<H>  3.8   |  24  |  1.08    Ca    9.1      13 Sep 2019 06:15  Phos  2.6     09-13  Mg     1.7     09-13                          8.5    6.72  )-----------( 222      ( 13 Sep 2019 06:15 )             26.8     CAPILLARY BLOOD GLUCOSE        MEDICATIONS  (STANDING):  chlorhexidine 0.12% Liquid 15 milliLiter(s) Oral Mucosa every 12 hours  chlorhexidine 4% Liquid 1 Application(s) Topical <User Schedule>  influenza   Vaccine 0.5 milliLiter(s) IntraMuscular once  labetalol Injectable 20 milliGRAM(s) IV Push every 6 hours  lactated ringers. 1000 milliLiter(s) (75 mL/Hr) IV Continuous <Continuous>  pantoprazole  Injectable 40 milliGRAM(s) IV Push daily  potassium phosphate IVPB 15 milliMole(s) IV Intermittent once    MEDICATIONS  (PRN):  hydrALAZINE Injectable 20 milliGRAM(s) IV Push every 6 hours PRN SBP> 140  HYDROmorphone  Injectable 0.5 milliGRAM(s) IV Push every 4 hours PRN Moderate Pain (4 - 6)  HYDROmorphone  Injectable 1 milliGRAM(s) IV Push every 4 hours PRN Severe Pain (7 - 10)      Frazier:	  [ x] None	[ ] Daily Frazier Order Placed	   Indication:	  [ ] Strict I and O's    [ ] Obstruction     [ ] Incontinence + Stage 3 or 4 Decubitus  Central Line:  [x ] None	   [ ]  Medication / TPN Administration     [ ] No Peripheral IV

## 2019-09-13 NOTE — PROGRESS NOTE ADULT - SUBJECTIVE AND OBJECTIVE BOX
Otolaryngology - Head & Neck Surgery Progress Note    Brief HPI  63M with hx of KHURRAM admitted for observation s/p left hemithyroidectomy for compressive MNG.     Hospital Course  9/10: Patient admitted for L hemithyroidectomy. Initial surgery was uncomplicated. Was noted to be hypertensive in PACU, poorly responded to home meds. Over the course of the evening, became increasingly dyspneic and developed increasing left neck fullness. Was subsequently taken back to OR for awake intubation, neck exploration, evacuation of hematoma and control of bleeding. Was transferred to PACU intubated, in stable condition.  BP well controlled on nicardipine drip started in OR.  9/11: NAEON, remained on cardene gtt in am, extubated successfully. Failed SLP eval and po trial, remained NPO. Voice improved. Changed to labetalol/hydralazine alternating, dc cardene gtt. Remained on bedrest.  9/12: NAEON, remains on alternating hydralazine/labetalol pending SLP evaluation for adv diet and po BP medication. Remained with frye and in bed in am.  9/13: NAEON. SLP eval this AM. Cleared for slick puree diet. Restarted on PO BP meds with improvement.     MEDICATIONS  (STANDING):  amLODIPine   Tablet 10 milliGRAM(s) Oral daily  chlorhexidine 0.12% Liquid 15 milliLiter(s) Oral Mucosa every 12 hours  chlorhexidine 4% Liquid 1 Application(s) Topical <User Schedule>  hydrALAZINE 25 milliGRAM(s) Oral every 8 hours  influenza   Vaccine 0.5 milliLiter(s) IntraMuscular once  labetalol 200 milliGRAM(s) Oral every 8 hours  lactated ringers. 1000 milliLiter(s) (75 mL/Hr) IV Continuous <Continuous>  pantoprazole  Injectable 40 milliGRAM(s) IV Push daily    MEDICATIONS  (PRN):      Vital Signs Last 24 Hrs  T(C): 37.7 (12 Sep 2019 04:52), Max: 37.7 (12 Sep 2019 04:52)  T(F): 99.9 (12 Sep 2019 04:52), Max: 99.9 (12 Sep 2019 04:52)  HR: 68 (12 Sep 2019 09:00) (48 - 78)  BP: 150/73 (12 Sep 2019 09:00) (95/54 - 157/74)  BP(mean): 105 (12 Sep 2019 09:00) (75 - 121)  RR: 13 (12 Sep 2019 09:00) (4 - 22)  SpO2: 98% (12 Sep 2019 09:00) (97% - 100%)    Vital Signs Last 24 Hrs  T(C): 37.2 (13 Sep 2019 13:22), Max: 37.7 (13 Sep 2019 04:53)  T(F): 98.9 (13 Sep 2019 13:22), Max: 99.8 (13 Sep 2019 04:53)  HR: 86 (13 Sep 2019 16:00) (68 - 88)  BP: 156/73 (13 Sep 2019 16:00) (124/64 - 178/82)  BP(mean): 105 (13 Sep 2019 16:00) (89 - 129)  RR: 14 (13 Sep 2019 16:00) (9 - 27)  SpO2: 98% (13 Sep 2019 16:00) (93% - 100%)    Physical Exam  NAD, A&Ox3  Nonlabored breathing on RA, tolerating secretions  No stridor or stertor  Voice mildly hoarse  Face symmetric  Neck supple, soft flat no ballotable collection  Neck incisions c/d/i, ZACK to R neck draining ss output      A/P:  63M s/p left hemithyroidectomy for MNG with compressive symptoms. Postop course complicated by hypertension and expanding neck hematoma s/p neck exploration and evacuation of hematoma. Extubated successfully, however failed SLP after extubation. Repeat SLP eval today; cleared for slick puree. Was restarted on PO meds with improvement in BP.  - remain SICU care until BP stable  -BP control per SICU team  -please obtain medicine consult for BP management following transition from nicardipine drip  -Ancef x 7 days  -monitor ZACK output  -Page ENT with further questions/concerns

## 2019-09-13 NOTE — DIETITIAN INITIAL EVALUATION ADULT. - OTHER INFO
Pt seen for initial assessment. 63M with hx of KHURRAM admitted for observation s/p left hemithyroidectomy for compressive MNG. Pt became increasingly dyspneic and developed increasing left neck fullness. Was subsequently taken back to OR for awake intubation, neck exploration, evacuation of hematoma and control of bleeding. Extubated 9/11. Per flowsheet 9/13, SLP w/ recommendation for dysphagia 1 slick puree, thin liquids at this time, pending verification. Skin: 1+ edema L and R ankles, +surgical incision. Pt seen resting in bed, awake, alert. Pt currently does not have an active diet order. Reports good appetite PTA, no dietary restrictions. NKFA. Pt reports undergoing sleeve gastrectomy ~2yrs ago, wt prior to surgery of 300lbs w/ associated 116lbs wt loss over 1 year period, wt has now been stable ~1 year. Supplements PTA: MVI, vitamin B12, discontinued calcium w/ vitamin D. Pt was following up with RD in bariatric outpatient center. No apparent N/V/C/D with last BM 9/10. Not reporting pain at this time. RD to follow up per protocol.

## 2019-09-14 LAB
ANION GAP SERPL CALC-SCNC: 11 MMOL/L — SIGNIFICANT CHANGE UP (ref 5–17)
BUN SERPL-MCNC: 14 MG/DL — SIGNIFICANT CHANGE UP (ref 7–23)
CALCIUM SERPL-MCNC: 8.4 MG/DL — SIGNIFICANT CHANGE UP (ref 8.4–10.5)
CHLORIDE SERPL-SCNC: 102 MMOL/L — SIGNIFICANT CHANGE UP (ref 96–108)
CO2 SERPL-SCNC: 24 MMOL/L — SIGNIFICANT CHANGE UP (ref 22–31)
CREAT SERPL-MCNC: 1.04 MG/DL — SIGNIFICANT CHANGE UP (ref 0.5–1.3)
GLUCOSE SERPL-MCNC: 89 MG/DL — SIGNIFICANT CHANGE UP (ref 70–99)
HCT VFR BLD CALC: 26.9 % — LOW (ref 39–50)
HGB BLD-MCNC: 8.5 G/DL — LOW (ref 13–17)
MAGNESIUM SERPL-MCNC: 1.6 MG/DL — SIGNIFICANT CHANGE UP (ref 1.6–2.6)
MCHC RBC-ENTMCNC: 25.9 PG — LOW (ref 27–34)
MCHC RBC-ENTMCNC: 31.6 GM/DL — LOW (ref 32–36)
MCV RBC AUTO: 82 FL — SIGNIFICANT CHANGE UP (ref 80–100)
NRBC # BLD: 0 /100 WBCS — SIGNIFICANT CHANGE UP (ref 0–0)
PHOSPHATE SERPL-MCNC: 2.4 MG/DL — LOW (ref 2.5–4.5)
PLATELET # BLD AUTO: 217 K/UL — SIGNIFICANT CHANGE UP (ref 150–400)
POTASSIUM SERPL-MCNC: 3.8 MMOL/L — SIGNIFICANT CHANGE UP (ref 3.5–5.3)
POTASSIUM SERPL-SCNC: 3.8 MMOL/L — SIGNIFICANT CHANGE UP (ref 3.5–5.3)
RBC # BLD: 3.28 M/UL — LOW (ref 4.2–5.8)
RBC # FLD: 14.5 % — SIGNIFICANT CHANGE UP (ref 10.3–14.5)
SODIUM SERPL-SCNC: 137 MMOL/L — SIGNIFICANT CHANGE UP (ref 135–145)
WBC # BLD: 5.28 K/UL — SIGNIFICANT CHANGE UP (ref 3.8–10.5)
WBC # FLD AUTO: 5.28 K/UL — SIGNIFICANT CHANGE UP (ref 3.8–10.5)

## 2019-09-14 PROCEDURE — 99221 1ST HOSP IP/OBS SF/LOW 40: CPT | Mod: GC

## 2019-09-14 PROCEDURE — 99233 SBSQ HOSP IP/OBS HIGH 50: CPT | Mod: GC

## 2019-09-14 RX ORDER — POTASSIUM CHLORIDE 20 MEQ
20 PACKET (EA) ORAL ONCE
Refills: 0 | Status: COMPLETED | OUTPATIENT
Start: 2019-09-14 | End: 2019-09-14

## 2019-09-14 RX ORDER — POTASSIUM CHLORIDE 20 MEQ
20 PACKET (EA) ORAL ONCE
Refills: 0 | Status: DISCONTINUED | OUTPATIENT
Start: 2019-09-14 | End: 2019-09-14

## 2019-09-14 RX ORDER — MAGNESIUM SULFATE 500 MG/ML
2 VIAL (ML) INJECTION ONCE
Refills: 0 | Status: COMPLETED | OUTPATIENT
Start: 2019-09-14 | End: 2019-09-14

## 2019-09-14 RX ORDER — SODIUM,POTASSIUM PHOSPHATES 278-250MG
1 POWDER IN PACKET (EA) ORAL
Refills: 0 | Status: COMPLETED | OUTPATIENT
Start: 2019-09-14 | End: 2019-09-14

## 2019-09-14 RX ADMIN — AMLODIPINE BESYLATE 10 MILLIGRAM(S): 2.5 TABLET ORAL at 06:26

## 2019-09-14 RX ADMIN — Medication 1 TABLET(S): at 09:36

## 2019-09-14 RX ADMIN — Medication 20 MILLIEQUIVALENT(S): at 09:36

## 2019-09-14 RX ADMIN — SODIUM CHLORIDE 75 MILLILITER(S): 9 INJECTION, SOLUTION INTRAVENOUS at 06:45

## 2019-09-14 RX ADMIN — OXYCODONE AND ACETAMINOPHEN 2 TABLET(S): 5; 325 TABLET ORAL at 18:16

## 2019-09-14 RX ADMIN — CHLORHEXIDINE GLUCONATE 1 APPLICATION(S): 213 SOLUTION TOPICAL at 06:26

## 2019-09-14 RX ADMIN — Medication 1 TABLET(S): at 11:49

## 2019-09-14 RX ADMIN — Medication 200 MILLIGRAM(S): at 04:11

## 2019-09-14 RX ADMIN — CHLORHEXIDINE GLUCONATE 15 MILLILITER(S): 213 SOLUTION TOPICAL at 06:26

## 2019-09-14 RX ADMIN — Medication 25 MILLIGRAM(S): at 06:26

## 2019-09-14 RX ADMIN — Medication 200 MILLIGRAM(S): at 13:34

## 2019-09-14 RX ADMIN — Medication 50 GRAM(S): at 07:47

## 2019-09-14 RX ADMIN — OXYCODONE AND ACETAMINOPHEN 1 TABLET(S): 5; 325 TABLET ORAL at 10:30

## 2019-09-14 RX ADMIN — OXYCODONE AND ACETAMINOPHEN 1 TABLET(S): 5; 325 TABLET ORAL at 09:39

## 2019-09-14 RX ADMIN — Medication 1 TABLET(S): at 18:15

## 2019-09-14 RX ADMIN — PANTOPRAZOLE SODIUM 40 MILLIGRAM(S): 20 TABLET, DELAYED RELEASE ORAL at 11:24

## 2019-09-14 RX ADMIN — CHLORHEXIDINE GLUCONATE 15 MILLILITER(S): 213 SOLUTION TOPICAL at 18:15

## 2019-09-14 RX ADMIN — OXYCODONE AND ACETAMINOPHEN 2 TABLET(S): 5; 325 TABLET ORAL at 20:20

## 2019-09-14 RX ADMIN — Medication 25 MILLIGRAM(S): at 23:11

## 2019-09-14 RX ADMIN — Medication 25 MILLIGRAM(S): at 14:59

## 2019-09-14 RX ADMIN — Medication 1 TABLET(S): at 22:45

## 2019-09-14 NOTE — CONSULT NOTE ADULT - ASSESSMENT
63M with hx of KHURRAM admitted for observation s/p left hemithyroidectomy for compressive MNG. Initial surgery was uncomplicated. Medicine was consulted for control of blood pressure. During the episode of dyspnea and neck fullness patient's systolic blood pressure aston to 180s. Patient's BP is now well controlled.    #Essential Hypertension  - Patient states his home blood pressure regimen includes Bystolic 20 but is unsure what other home blood pressure meds are. He states he is not on hydralazine which is currently being given. Patient states his pharmacy is [facility], would call for med rec in AM to clarify regimen. However, can continue this regimen of amlodipine 10 qd, hydral 25 q8h, labetalol 200 q8h while patient is inpatient.  - Hypertensive episodes were likely secondary to pain/discomfort/dyspnea in the setting of acute surgical intervention and neck hematoma. Patient states his BP has been historically difficult to control but that the current regimen has been effective. EKG without LVH or signs of chronic hypertensive heart disease. Suspicion is low for other secondary causes of HTN.

## 2019-09-14 NOTE — PROGRESS NOTE ADULT - ASSESSMENT
A/p: 63M PMHx GERD, sleeve gastrectomy, admitted for compressive multinodular goiter, s/p left hemithyroidectomy (9/10), RTOR for evacuation of hematoma (9/11), admitted to SICU for HD monitoring.    Neuro: Dilaudid PRN   CV: passed S&S yesterday. started on PO labetalol 200 q8h and Hydralazine 25 q8h and norvasc 10 qd yesterday. BP ok.   Resp: no resp distress.   FENGI: passed S&S for puree thin liquid. tolerated diet.   : voids.   ID: surgical ppx: Ancef (9/10- )  Endo: FS wnl no need for ISS.   PPx: SCDs, no SQL/SQH per ENT due to recent bleed.   Wounds/lines: R radial chucho (9/11 - ), ZACK neck  OOb to chair.

## 2019-09-14 NOTE — PROGRESS NOTE ADULT - ATTENDING COMMENTS
Patient seen and examined with house-staff during bedside rounds.  Resident note read, including vitals, physical findings, laboratory data, and radiological reports.   Revisions included below.  Direct personal management at bed side and extensive interpretation of the data.  Plan was outlined and discussed in details with the housestaff.  Decision making of high complexity  Action taken for acute disease activity to reflect the level of care provided:  - medication reconciliation  - review laboratory data   respiratory failure, hypertension    The patient is clinically stable. The blood pressure is better controlled on oral agents. Patient tolerating diet with no abnormal dyscrasia nor aspiration. Neurological status is stable. No evidence of airway disorder

## 2019-09-14 NOTE — CONSULT NOTE ADULT - SUBJECTIVE AND OBJECTIVE BOX
HPI:  63M with hx of KHURRAM admitted for observation s/p left hemithyroidectomy for compressive MNG. Initial surgery was uncomplicated. Was noted to be hypertensive in PACU, poorly responded to home meds. Over the course of the evening, became increasingly dyspneic and developed increasing left neck fullness. Was subsequently taken back to OR for awake intubation, neck exploration, evacuation of hematoma and control of bleeding. Medicine was consulted for control of blood pressure. During the episode of dyspnea and neck fullness patient's systolic blood pressure aston to 180s. Patient's BP is now well controlled.    SUBJECTIVE / INTERVAL HPI: Patient seen and examined at bedside. Patient is currently asymptomatic, denies any discomfort, nausea, fever, chills, or any other complaints.     VITAL SIGNS:  Vital Signs Last 24 Hrs  T(C): 36.8 (14 Sep 2019 18:47), Max: 37.3 (13 Sep 2019 21:32)  T(F): 98.3 (14 Sep 2019 18:47), Max: 99.2 (13 Sep 2019 21:32)  HR: 74 (14 Sep 2019 18:00) (60 - 90)  BP: 129/65 (14 Sep 2019 18:00) (123/69 - 189/84)  BP(mean): 91 (14 Sep 2019 18:00) (91 - 126)  RR: 17 (14 Sep 2019 18:00) (8 - 22)  SpO2: 99% (14 Sep 2019 18:00) (97% - 100%)    REVIEW OF SYSTEMS:    CONSTITUTIONAL: No weakness, fevers or chills.   EYES/ENT: No visual changes;  No vertigo or throat pain   NECK: No pain or stiffness.   RESPIRATORY: No cough, wheezing, hemoptysis; No shortness of breath  CARDIOVASCULAR: No chest pain or palpitations  GASTROINTESTINAL: No abdominal or epigastric pain. No nausea, vomiting, or hematemesis; No diarrhea or constipation. No melena or hematochezia.  GENITOURINARY: No dysuria, frequency or hematuria  NEUROLOGICAL: No numbness or weakness  SKIN: No itching, burning, rashes, or lesions   All other review of systems is negative unless indicated above.    PHYSICAL EXAM:  General: WDWN  HEENT: NC/AT; PERRL, clear conjunctiva  Neck: supple, no JVD. Surgical site appears clean dry and intact.   Cardiovascular: +S1/S2; RRR, no M/R/G  Respiratory: CTA b/l; no W/R/R  Gastrointestinal: soft, NT/ND; +BSx4  Extremities: WWP; 2+ peripheral pulses; no edema   Neurological: AAOx3; no focal deficits    MEDICATIONS:  MEDICATIONS  (STANDING):  amLODIPine   Tablet 10 milliGRAM(s) Oral daily  chlorhexidine 0.12% Liquid 15 milliLiter(s) Oral Mucosa every 12 hours  chlorhexidine 4% Liquid 1 Application(s) Topical <User Schedule>  hydrALAZINE 25 milliGRAM(s) Oral every 8 hours  influenza   Vaccine 0.5 milliLiter(s) IntraMuscular once  labetalol 200 milliGRAM(s) Oral every 8 hours  pantoprazole  Injectable 40 milliGRAM(s) IV Push daily  potassium acid phosphate/sodium acid phosphate tablet (K-PHOS No. 2) 1 Tablet(s) Oral four times a day with meals    MEDICATIONS  (PRN):  acetaminophen    Suspension .. 650 milliGRAM(s) Oral every 6 hours PRN Mild Pain (1 - 3)  oxyCODONE    5 mG/acetaminophen 325 mG 1 Tablet(s) Oral every 4 hours PRN Moderate Pain (4 - 6)  oxyCODONE    5 mG/acetaminophen 325 mG 2 Tablet(s) Oral every 6 hours PRN Severe Pain (7 - 10)      ALLERGIES:  Allergies    No Known Allergies    Intolerances        LABS:                        8.5    5.28  )-----------( 217      ( 14 Sep 2019 05:33 )             26.9     09-14    137  |  102  |  14  ----------------------------<  89  3.8   |  24  |  1.04    Ca    8.4      14 Sep 2019 05:31  Phos  2.4     09-14  Mg     1.6     09-14          CAPILLARY BLOOD GLUCOSE          RADIOLOGY & ADDITIONAL TESTS: Reviewed. HPI:  63M with hx of essential hypertension KHURRAM admitted for observation s/p left hemithyroidectomy for compressive MNG. Initial surgery was uncomplicated. Was noted to be hypertensive in PACU, poorly responded to home meds. Over the course of the evening, became increasingly dyspneic and developed increasing left neck fullness. Was subsequently taken back to OR for awake intubation, neck exploration, evacuation of hematoma and control of bleeding. Medicine was consulted for control of blood pressure. During the episode of dyspnea and neck fullness patient's systolic blood pressure aston to 180s. Patient's BP is now well controlled.    SUBJECTIVE / INTERVAL HPI: Patient seen and examined at bedside. Patient is currently asymptomatic, denies any discomfort, nausea, fever, chills, or any other complaints.     VITAL SIGNS:  Vital Signs Last 24 Hrs  T(C): 36.8 (14 Sep 2019 18:47), Max: 37.3 (13 Sep 2019 21:32)  T(F): 98.3 (14 Sep 2019 18:47), Max: 99.2 (13 Sep 2019 21:32)  HR: 74 (14 Sep 2019 18:00) (60 - 90)  BP: 129/65 (14 Sep 2019 18:00) (123/69 - 189/84)  BP(mean): 91 (14 Sep 2019 18:00) (91 - 126)  RR: 17 (14 Sep 2019 18:00) (8 - 22)  SpO2: 99% (14 Sep 2019 18:00) (97% - 100%)    REVIEW OF SYSTEMS:    CONSTITUTIONAL: No weakness, fevers or chills.   EYES/ENT: No visual changes;  No vertigo or throat pain   NECK: No pain or stiffness.   RESPIRATORY: No cough, wheezing, hemoptysis; No shortness of breath  CARDIOVASCULAR: No chest pain or palpitations  GASTROINTESTINAL: No abdominal or epigastric pain. No nausea, vomiting, or hematemesis; No diarrhea or constipation. No melena or hematochezia.  GENITOURINARY: No dysuria, frequency or hematuria  NEUROLOGICAL: No numbness or weakness  SKIN: No itching, burning, rashes, or lesions   All other review of systems is negative unless indicated above.    PHYSICAL EXAM:  General: WDWN  HEENT: NC/AT; PERRL, clear conjunctiva  Neck: supple, no JVD. Surgical site appears clean dry and intact.   Cardiovascular: +S1/S2; RRR, no M/R/G  Respiratory: CTA b/l; no W/R/R  Gastrointestinal: soft, NT/ND; +BSx4  Extremities: WWP; 2+ peripheral pulses; no edema   Neurological: AAOx3; no focal deficits    MEDICATIONS:  MEDICATIONS  (STANDING):  amLODIPine   Tablet 10 milliGRAM(s) Oral daily  chlorhexidine 0.12% Liquid 15 milliLiter(s) Oral Mucosa every 12 hours  chlorhexidine 4% Liquid 1 Application(s) Topical <User Schedule>  hydrALAZINE 25 milliGRAM(s) Oral every 8 hours  influenza   Vaccine 0.5 milliLiter(s) IntraMuscular once  labetalol 200 milliGRAM(s) Oral every 8 hours  pantoprazole  Injectable 40 milliGRAM(s) IV Push daily  potassium acid phosphate/sodium acid phosphate tablet (K-PHOS No. 2) 1 Tablet(s) Oral four times a day with meals    MEDICATIONS  (PRN):  acetaminophen    Suspension .. 650 milliGRAM(s) Oral every 6 hours PRN Mild Pain (1 - 3)  oxyCODONE    5 mG/acetaminophen 325 mG 1 Tablet(s) Oral every 4 hours PRN Moderate Pain (4 - 6)  oxyCODONE    5 mG/acetaminophen 325 mG 2 Tablet(s) Oral every 6 hours PRN Severe Pain (7 - 10)      ALLERGIES:  Allergies    No Known Allergies    Intolerances        LABS:                        8.5    5.28  )-----------( 217      ( 14 Sep 2019 05:33 )             26.9     09-14    137  |  102  |  14  ----------------------------<  89  3.8   |  24  |  1.04    Ca    8.4      14 Sep 2019 05:31  Phos  2.4     09-14  Mg     1.6     09-14          CAPILLARY BLOOD GLUCOSE          RADIOLOGY & ADDITIONAL TESTS: Reviewed.

## 2019-09-14 NOTE — CONSULT NOTE ADULT - ATTENDING COMMENTS
Patient seen and examined with house-staff during bedside rounds.  Resident note read, including vitals, physical findings, laboratory data, and radiological reports.   Revisions included below.  Direct personal management at bed side and extensive interpretation of the data.  Plan was outlined and discussed in details with the housestaff.  Decision making of high complexity  Action taken for acute disease activity to reflect the level of care provided:  - medication reconciliation  - review laboratory data  Respiratory failure    Hypertension    Patient was weaned off sedation    The blood pressure is controlled on nicardipine and was started on IV hydralazine    Patient underwent successful spontaneous breathing trial and was to be extubated    Patient is able to protect her airway and with adequate urine output    Hemoglobin is stable    Creatinine is stable
Agree with above.     Recommend medication reconciliation with pharmacy and resuming home anti hypertensives once confirmed. Current regimen working well while IP but would not discharge on this, especially given TID nature of these medications.

## 2019-09-14 NOTE — PROGRESS NOTE ADULT - SUBJECTIVE AND OBJECTIVE BOX
S: No new issues/events overnight, no new med c/o  tolerated puree diet yesterday.   no sob.     O: ICU Vital Signs Last 24 Hrs  T(F): 98.7 (09-14-19 @ 09:00), Max: 99.5 (09-13-19 @ 17:54)  HR: 76 (09-14-19 @ 09:01) (64 - 86)  BP: 141/74 (09-14-19 @ 09:01) (124/64 - 189/84)  BP(mean): 101 (09-14-19 @ 09:01) (89 - 129)  ABP: --  RR: 15 (09-14-19 @ 09:01) (9 - 22)  SpO2: 99% (09-14-19 @ 09:01) (97% - 100%)    PHYSICAL EXAM:   Neurological: AAOx3, CNII-XII intact,  strength 5/5 b/l  ENT: ant neck incision clean, no erythema, no pus. ZACK serosang.   Cardiovascular: RRR  Respiratory: CTA  Gastrointestinal: soft, NT, ND, BS+  Extremities: warm, no dependent edema  Vascular: no cyanosis/erythema      LABS:    09-14    137  |  102  |  14  ----------------------------<  89  3.8   |  24  |  1.04    Ca    8.4      14 Sep 2019 05:31  Phos  2.4     09-14  Mg     1.6     09-14                          8.5    5.28  )-----------( 217      ( 14 Sep 2019 05:33 )             26.9     CAPILLARY BLOOD GLUCOSE        MEDICATIONS  (STANDING):  amLODIPine   Tablet 10 milliGRAM(s) Oral daily  chlorhexidine 0.12% Liquid 15 milliLiter(s) Oral Mucosa every 12 hours  chlorhexidine 4% Liquid 1 Application(s) Topical <User Schedule>  hydrALAZINE 25 milliGRAM(s) Oral every 8 hours  influenza   Vaccine 0.5 milliLiter(s) IntraMuscular once  labetalol 200 milliGRAM(s) Oral every 8 hours  pantoprazole  Injectable 40 milliGRAM(s) IV Push daily  potassium acid phosphate/sodium acid phosphate tablet (K-PHOS No. 2) 1 Tablet(s) Oral four times a day with meals    MEDICATIONS  (PRN):  acetaminophen    Suspension .. 650 milliGRAM(s) Oral every 6 hours PRN Mild Pain (1 - 3)  oxyCODONE    5 mG/acetaminophen 325 mG 1 Tablet(s) Oral every 4 hours PRN Moderate Pain (4 - 6)  oxyCODONE    5 mG/acetaminophen 325 mG 2 Tablet(s) Oral every 6 hours PRN Severe Pain (7 - 10)      Frazier:	  [ x] None	[ ] Daily Frazier Order Placed	   Indication:	  [ ] Strict I and O's    [ ] Obstruction     [ ] Incontinence + Stage 3 or 4 Decubitus  Central Line:  [ x] None	   [ ]  Medication / TPN Administration     [ ] No Peripheral IV

## 2019-09-15 ENCOUNTER — TRANSCRIPTION ENCOUNTER (OUTPATIENT)
Age: 64
End: 2019-09-15

## 2019-09-15 VITALS
OXYGEN SATURATION: 100 % | SYSTOLIC BLOOD PRESSURE: 140 MMHG | DIASTOLIC BLOOD PRESSURE: 67 MMHG | RESPIRATION RATE: 22 BRPM | HEART RATE: 82 BPM

## 2019-09-15 LAB
ANION GAP SERPL CALC-SCNC: 10 MMOL/L — SIGNIFICANT CHANGE UP (ref 5–17)
BUN SERPL-MCNC: 13 MG/DL — SIGNIFICANT CHANGE UP (ref 7–23)
CALCIUM SERPL-MCNC: 9 MG/DL — SIGNIFICANT CHANGE UP (ref 8.4–10.5)
CHLORIDE SERPL-SCNC: 100 MMOL/L — SIGNIFICANT CHANGE UP (ref 96–108)
CO2 SERPL-SCNC: 27 MMOL/L — SIGNIFICANT CHANGE UP (ref 22–31)
CREAT SERPL-MCNC: 1.21 MG/DL — SIGNIFICANT CHANGE UP (ref 0.5–1.3)
GLUCOSE SERPL-MCNC: 110 MG/DL — HIGH (ref 70–99)
POTASSIUM SERPL-MCNC: 4.1 MMOL/L — SIGNIFICANT CHANGE UP (ref 3.5–5.3)
POTASSIUM SERPL-SCNC: 4.1 MMOL/L — SIGNIFICANT CHANGE UP (ref 3.5–5.3)
SODIUM SERPL-SCNC: 137 MMOL/L — SIGNIFICANT CHANGE UP (ref 135–145)

## 2019-09-15 RX ORDER — IBUPROFEN 200 MG
1 TABLET ORAL
Qty: 0 | Refills: 0 | DISCHARGE

## 2019-09-15 RX ORDER — AMLODIPINE BESYLATE 2.5 MG/1
1 TABLET ORAL
Qty: 10 | Refills: 0
Start: 2019-09-15 | End: 2019-09-24

## 2019-09-15 RX ORDER — ASPIRIN/CALCIUM CARB/MAGNESIUM 324 MG
1 TABLET ORAL
Qty: 0 | Refills: 0 | DISCHARGE

## 2019-09-15 RX ORDER — HYDRALAZINE HCL 50 MG
1 TABLET ORAL
Qty: 30 | Refills: 0
Start: 2019-09-15 | End: 2019-09-24

## 2019-09-15 RX ORDER — OXYCODONE HYDROCHLORIDE 5 MG/1
1 TABLET ORAL
Qty: 0 | Refills: 0 | DISCHARGE

## 2019-09-15 RX ORDER — METHOCARBAMOL 500 MG/1
2 TABLET, FILM COATED ORAL
Qty: 0 | Refills: 0 | DISCHARGE

## 2019-09-15 RX ORDER — NEBIVOLOL HYDROCHLORIDE 5 MG/1
1 TABLET ORAL
Qty: 0 | Refills: 0 | DISCHARGE

## 2019-09-15 RX ORDER — LABETALOL HCL 100 MG
1 TABLET ORAL
Qty: 30 | Refills: 0
Start: 2019-09-15 | End: 2019-09-24

## 2019-09-15 RX ADMIN — Medication 25 MILLIGRAM(S): at 14:16

## 2019-09-15 RX ADMIN — PANTOPRAZOLE SODIUM 40 MILLIGRAM(S): 20 TABLET, DELAYED RELEASE ORAL at 13:04

## 2019-09-15 RX ADMIN — Medication 25 MILLIGRAM(S): at 07:06

## 2019-09-15 RX ADMIN — OXYCODONE AND ACETAMINOPHEN 1 TABLET(S): 5; 325 TABLET ORAL at 05:35

## 2019-09-15 RX ADMIN — AMLODIPINE BESYLATE 10 MILLIGRAM(S): 2.5 TABLET ORAL at 05:28

## 2019-09-15 RX ADMIN — Medication 200 MILLIGRAM(S): at 05:28

## 2019-09-15 RX ADMIN — CHLORHEXIDINE GLUCONATE 1 APPLICATION(S): 213 SOLUTION TOPICAL at 07:06

## 2019-09-15 RX ADMIN — CHLORHEXIDINE GLUCONATE 15 MILLILITER(S): 213 SOLUTION TOPICAL at 05:28

## 2019-09-15 RX ADMIN — Medication 200 MILLIGRAM(S): at 13:04

## 2019-09-15 RX ADMIN — OXYCODONE AND ACETAMINOPHEN 1 TABLET(S): 5; 325 TABLET ORAL at 04:35

## 2019-09-15 NOTE — DISCHARGE NOTE PROVIDER - NSDCFUADDAPPT_GEN_ALL_CORE_FT
Call for appointment.  Follow up with your cardiologist as soon as possible after discharge. No longer than 1 week after discharge.

## 2019-09-15 NOTE — PROGRESS NOTE ADULT - ASSESSMENT
63M with hx of KHURRAM admitted for observation s/p left hemithyroidectomy for compressive MNG. Initial surgery was uncomplicated. Medicine was consulted for control of blood pressure. During the episode of dyspnea and neck fullness patient's systolic blood pressure aston to 180s. Patient's BP is now well controlled.    #Essential Hypertension  -As mentioned yesterday, hypertensive episodes were likely 2/2 pain, discomfort, dyspnea, and emotional agitation. Would keep him on same medications as his blood pressure is better controlled today.

## 2019-09-15 NOTE — DISCHARGE NOTE PROVIDER - NSDCACTIVITY_GEN_ALL_CORE
Do not drive or operate machinery/Do not make important decisions/Walking - Outdoors allowed/Bathing allowed/Walking - Indoors allowed/Stairs allowed/No heavy lifting/straining

## 2019-09-15 NOTE — PROGRESS NOTE ADULT - SUBJECTIVE AND OBJECTIVE BOX
Interval Hx:  Patient seen and examined at bedside. Still with sore throat and pain around operative site    HPI:  63M with hx of KHURRAM admitted for observation s/p left hemithyroidectomy for compressive MNG. Initial surgery was uncomplicated. Was noted to be hypertensive in PACU, poorly responded to home meds. Over the course of the evening, became increasingly dyspneic and developed increasing left neck fullness. Was subsequently taken back to OR for awake intubation, neck exploration, evacuation of hematoma and control of bleeding.     Was transferred to PACU intubated, in stable condition.  BP well controlled on nicardipine drip started in OR. (11 Sep 2019 08:00)      ADDITIONAL MEDICINE HPI:    REVIEW OF SYSTEMS:   Otherwise negative except as specified in HPI    PAST MEDICAL HISTORY:     PAST SURGICAL HISTORY:    FAMILY HISTORY:    SOCIAL HISTORY:  Tobacco use:  EtOH use:  Illicit drug use:    MEDICATIONS:  MEDICATIONS  (STANDING):  amLODIPine   Tablet 10 milliGRAM(s) Oral daily  chlorhexidine 0.12% Liquid 15 milliLiter(s) Oral Mucosa every 12 hours  chlorhexidine 4% Liquid 1 Application(s) Topical <User Schedule>  hydrALAZINE 25 milliGRAM(s) Oral every 8 hours  influenza   Vaccine 0.5 milliLiter(s) IntraMuscular once  labetalol 200 milliGRAM(s) Oral every 8 hours  pantoprazole  Injectable 40 milliGRAM(s) IV Push daily    MEDICATIONS  (PRN):  acetaminophen    Suspension .. 650 milliGRAM(s) Oral every 6 hours PRN Mild Pain (1 - 3)  oxyCODONE    5 mG/acetaminophen 325 mG 1 Tablet(s) Oral every 4 hours PRN Moderate Pain (4 - 6)  oxyCODONE    5 mG/acetaminophen 325 mG 2 Tablet(s) Oral every 6 hours PRN Severe Pain (7 - 10)      ALLERGIES:  Allergies    No Known Allergies    Intolerances        VITAL SIGNS:  Vital Signs Last 24 Hrs  T(C): 36.9 (15 Sep 2019 09:30), Max: 37.2 (14 Sep 2019 13:53)  T(F): 98.4 (15 Sep 2019 09:30), Max: 98.9 (14 Sep 2019 13:53)  HR: 82 (15 Sep 2019 12:00) (58 - 92)  BP: 140/67 (15 Sep 2019 12:00) (113/60 - 167/84)  BP(mean): 96 (15 Sep 2019 12:00) (79 - 118)  RR: 22 (15 Sep 2019 12:00) (8 - 28)  SpO2: 100% (15 Sep 2019 12:00) (98% - 100%)    09-14-19 @ 07:01  -  09-15-19 @ 07:00  --------------------------------------------------------  IN:    lactated ringers.: 75 mL    Oral Fluid: 1180 mL    Solution: 50 mL  Total IN: 1305 mL    OUT:    Bulb: 15 mL    Voided: 2905 mL  Total OUT: 2920 mL    Total NET: -1615 mL      09-15-19 @ 07:01  -  09-15-19 @ 13:03  --------------------------------------------------------  IN:    Oral Fluid: 480 mL  Total IN: 480 mL    OUT:    Voided: 200 mL  Total OUT: 200 mL    Total NET: 280 mL          PHYSICAL EXAM:  Constitutional: WDWN resting comfortably in bed; NAD  Head: NC/AT  Eyes: PERRL, EOMI, anicteric sclera  ENT: no nasal discharge; uvula midline, no oropharyngeal erythema or exudates; MMM;   Neck: supple; no JVD; Drain on right neck from operative site, which looks CDI  Respiratory: CTA B/L; no W/R/R, no retractions  Cardiac: +S1/S2; RRR; no M/R/G; PMI non-displaced  Gastrointestinal: abdomen soft, NT/ND; no rebound or guarding; +BSx4  Extremities: WWP, no clubbing or cyanosis; no peripheral edema  Musculoskeletal: NROM x4; no joint swelling, tenderness or erythema  Vascular: 2+ radial, femoral, DP/PT pulses B/L  Dermatologic: skin warm, dry and intact; no rashes, wounds, or scars  Neurologic: AAOx3; CNII-XII grossly intact; no focal deficits  Psychiatric: affect and characteristics of appearance, verbalizations, behaviors are appropriate    LABS:                        8.5    5.28  )-----------( 217      ( 14 Sep 2019 05:33 )             26.9     09-15    137  |  100  |  13  ----------------------------<  110<H>  4.1   |  27  |  1.21    Ca    9.0      15 Sep 2019 10:28  Phos  2.4     09-14  Mg     1.6     09-14              CAPILLARY BLOOD GLUCOSE              RADIOLOGY & ADDITIONAL TESTS: Reviewed.

## 2019-09-15 NOTE — DISCHARGE NOTE PROVIDER - CARE PROVIDER_API CALL
Moe Austin)  Otolaryngology  [address] [place] [address]  Phone: [phone]  Fax: [phone]  Follow Up Time:

## 2019-09-15 NOTE — DISCHARGE NOTE NURSING/CASE MANAGEMENT/SOCIAL WORK - PATIENT PORTAL LINK FT
You can access the FollowMyHealth Patient Portal offered by [facility] by registering at the following website: [email or web address]. By joining [employer]’s FollowMyHealth portal, you will also be able to view your health information using other applications (apps) compatible with our system.

## 2019-09-15 NOTE — PROGRESS NOTE ADULT - SUBJECTIVE AND OBJECTIVE BOX
Otolaryngology - Head & Neck Surgery Progress Note    Brief HPI  63M with hx of KHURRAM admitted for observation s/p left hemithyroidectomy for compressive MNG.     Hospital Course  9/10: Patient admitted for L hemithyroidectomy. Initial surgery was uncomplicated. Was noted to be hypertensive in PACU, poorly responded to home meds. Over the course of the evening, became increasingly dyspneic and developed increasing left neck fullness. Was subsequently taken back to OR for awake intubation, neck exploration, evacuation of hematoma and control of bleeding. Was transferred to PACU intubated, in stable condition.  BP well controlled on nicardipine drip started in OR.  9/11: NAEON, remained on cardene gtt in am, extubated successfully. Failed SLP eval and po trial, remained NPO. Voice improved. Changed to labetalol/hydralazine alternating, dc cardene gtt. Remained on bedrest.  9/12: NAEON, remains on alternating hydralazine/labetalol pending SLP evaluation for adv diet and po BP medication. Remained with frye and in bed in am.  9/13: NAEON. SLP eval this AM. Cleared for slick puree diet. Restarted on PO BP meds with improvement.   9/14: NAEON. BP remained well controlled on PO BP meds. Downgraded from SICU to floor. Tolerating PO puree diet.   9/15: Was hypertensive to 160s overnight. Resolved this AM after receiving PO meds. Otherwise doing well. Tolerating puree diet. Pain well controlled.     Vital Signs Last 24 Hrs  T(C): 36.9 (15 Sep 2019 09:30), Max: 37.2 (14 Sep 2019 13:53)  T(F): 98.4 (15 Sep 2019 09:30), Max: 98.9 (14 Sep 2019 13:53)  HR: 66 (15 Sep 2019 11:00) (58 - 92)  BP: 140/72 (15 Sep 2019 11:00) (113/60 - 167/84)  BP(mean): 100 (15 Sep 2019 11:00) (79 - 118)  RR: 25 (15 Sep 2019 11:00) (8 - 28)  SpO2: 100% (15 Sep 2019 11:00) (98% - 100%)    Physical Exam  NAD, A&Ox3  Nonlabored breathing on RA, tolerating secretions  No stridor or stertor  Voice strong and clear.   Face symmetric  Neck supple, soft flat no palpable collection  Neck incisions c/d/i, ZACK to R neck draining ss output      A/P:  63M s/p left hemithyroidectomy for MNG with compressive symptoms. Postop course complicated by hypertension and expanding neck hematoma s/p neck exploration and evacuation of hematoma. Extubated successfully, however failed SLP after extubation. Repeat SLP eval today; cleared for dickson horvath. Was restarted on PO meds with improvement in BP.  - c/w current BP meds per medicine consult recs  -plan for DC home today with outpatient follow up with his cardiologist  -[facility] with further questions/concerns    d/w Dr carrasquillo

## 2019-09-15 NOTE — DISCHARGE NOTE PROVIDER - NSDCFUADDINST_GEN_ALL_CORE_FT
Keep neck dry x 24 hours. OK to shower after 24 hours. OK to removed small drain dressing after 24 hours (can replace with gauze/tape if draining). Neck incision dressing will fall off on it's own. No strenuous activity/running/lifting for 3 weeks. take tylenol as needed for pain. Call or return to the ED for any bleeding, fevers, chills, drainage from wounds, difficulty breathing, increasing neck fullness.

## 2019-09-15 NOTE — DISCHARGE NOTE PROVIDER - HOSPITAL COURSE
63M with hx of KHURRAM admitted for observation s/p left hemithyroidectomy for compressive MNG.         Hospital Course    9/10: Patient admitted for L hemithyroidectomy. Initial surgery was uncomplicated. Was noted to be hypertensive in PACU, poorly responded to home meds. Over the course of the evening, became increasingly dyspneic and developed increasing left neck fullness. Was subsequently taken back to OR for awake intubation, neck exploration, evacuation of hematoma and control of bleeding. Was transferred to PACU intubated, in stable condition.  BP well controlled on nicardipine drip started in OR.    9/11: NAEON, remained on cardene gtt in am, extubated successfully. Failed SLP eval and po trial, remained NPO. Voice improved. Changed to labetalol/hydralazine alternating, dc cardene gtt. Remained on bedrest.    9/12: NAEON, remains on alternating hydralazine/labetalol pending SLP evaluation for adv diet and po BP medication. Remained with frye and in bed in am.    9/13: NAEON. SLP eval this AM. Cleared for slick puree diet. Restarted on PO BP meds with improvement.     9/14: NAEON. BP remained well controlled on PO BP meds. Downgraded from SICU to floor. Tolerating PO puree diet.     9/15: Was hypertensive to 160s overnight. Resolved this AM after receiving PO meds. Otherwise doing well. Tolerating puree diet. Pain well controlled.

## 2019-09-19 PROCEDURE — 80048 BASIC METABOLIC PNL TOTAL CA: CPT

## 2019-09-19 PROCEDURE — 36415 COLL VENOUS BLD VENIPUNCTURE: CPT

## 2019-09-19 PROCEDURE — C1889: CPT

## 2019-09-19 PROCEDURE — 92526 ORAL FUNCTION THERAPY: CPT

## 2019-09-19 PROCEDURE — 94002 VENT MGMT INPAT INIT DAY: CPT

## 2019-09-19 PROCEDURE — 85027 COMPLETE CBC AUTOMATED: CPT

## 2019-09-19 PROCEDURE — 82803 BLOOD GASES ANY COMBINATION: CPT

## 2019-09-19 PROCEDURE — 71045 X-RAY EXAM CHEST 1 VIEW: CPT

## 2019-09-19 PROCEDURE — 82962 GLUCOSE BLOOD TEST: CPT

## 2019-09-19 PROCEDURE — 84100 ASSAY OF PHOSPHORUS: CPT

## 2019-09-19 PROCEDURE — 85730 THROMBOPLASTIN TIME PARTIAL: CPT

## 2019-09-19 PROCEDURE — 92610 EVALUATE SWALLOWING FUNCTION: CPT

## 2019-09-19 PROCEDURE — 83735 ASSAY OF MAGNESIUM: CPT

## 2019-09-19 PROCEDURE — 97161 PT EVAL LOW COMPLEX 20 MIN: CPT

## 2019-09-19 PROCEDURE — 86803 HEPATITIS C AB TEST: CPT

## 2019-09-19 PROCEDURE — 93971 EXTREMITY STUDY: CPT

## 2019-09-19 PROCEDURE — 85025 COMPLETE CBC W/AUTO DIFF WBC: CPT

## 2019-09-19 PROCEDURE — 85610 PROTHROMBIN TIME: CPT

## 2019-09-19 PROCEDURE — 88307 TISSUE EXAM BY PATHOLOGIST: CPT

## 2019-09-20 DIAGNOSIS — K21.9 GASTRO-ESOPHAGEAL REFLUX DISEASE WITHOUT ESOPHAGITIS: ICD-10-CM

## 2019-09-20 DIAGNOSIS — E04.2 NONTOXIC MULTINODULAR GOITER: ICD-10-CM

## 2019-09-20 DIAGNOSIS — J96.90 RESPIRATORY FAILURE, UNSPECIFIED, UNSPECIFIED WHETHER WITH HYPOXIA OR HYPERCAPNIA: ICD-10-CM

## 2019-09-20 DIAGNOSIS — D62 ACUTE POSTHEMORRHAGIC ANEMIA: ICD-10-CM
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
## 2019-09-20 DIAGNOSIS — M96.841 POSTPROCEDURAL HEMATOMA OF A MUSCULOSKELETAL STRUCTURE FOLLOWING OTHER PROCEDURE: ICD-10-CM

## 2019-09-20 DIAGNOSIS — G47.33 OBSTRUCTIVE SLEEP APNEA (ADULT) (PEDIATRIC): ICD-10-CM

## 2019-09-25 ENCOUNTER — APPOINTMENT (OUTPATIENT)
Dept: OTOLARYNGOLOGY | Facility: CLINIC | Age: 64
End: 2019-09-25
Payer: COMMERCIAL

## 2019-09-25 VITALS
HEIGHT: 73 IN | DIASTOLIC BLOOD PRESSURE: 77 MMHG | WEIGHT: 176 LBS | OXYGEN SATURATION: 98 % | BODY MASS INDEX: 23.33 KG/M2 | HEART RATE: 80 BPM | SYSTOLIC BLOOD PRESSURE: 123 MMHG

## 2019-09-25 PROBLEM — E66.01 MORBID (SEVERE) OBESITY DUE TO EXCESS CALORIES: Chronic | Status: ACTIVE | Noted: 2019-09-09

## 2019-09-25 PROCEDURE — 99024 POSTOP FOLLOW-UP VISIT: CPT

## 2019-09-25 PROCEDURE — 31575 DIAGNOSTIC LARYNGOSCOPY: CPT | Mod: 58

## 2019-09-25 NOTE — PROCEDURE
[Image(s) Captured] : image(s) captured and filed [Unable to Cooperate with Mirror] : patient unable to cooperate with mirror [None] : none [Flexible Endoscope] : examined with the flexible endoscope [Normal] : the epiglottis was regular without inflammation, lesions or masses, with regular aryepiglottic folds, and a smooth petiolus [True Vocal Cords Paralysis] : no true vocal cord paralysis [True Vocal Cords Erythematous] : no true vocal cord edema

## 2019-09-26 NOTE — PHYSICAL EXAM
[Midline] : trachea located in midline position [Laryngoscopy Performed] : laryngoscopy was performed, see procedure section for findings [Normal] : cranial nerves 2-12 intact [de-identified] : healthy neck wound, no erythema, neck swelling or tenderness

## 2019-09-26 NOTE — HISTORY OF PRESENT ILLNESS
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 He is presenting today for postoperative evaluation. \par His postoperative care was complicated with hematoma on POD#1. He was taking back to OR for hematoma evacuation and bleeding control. He had elevated BP postoperatively which maybe contributed to his hematoma development. \par  [FreeTextEntry1] : Patient is doing well. Tolerating regular diet. No aspiration symptoms and hoarseness. He has normal BP on today's exam and he will continue to f/u with his internist.

## 2019-09-26 NOTE — ASSESSMENT
[FreeTextEntry1] : 63M with left thyroid goiter s/p left hemithyroidectomy. Postoperative course was complicated with hematoma and uncontrolled BP. Today patient is doing well with normal voice, swallowing and healthy neck wound. Pathology final report is hyperplastic nodular goiter. \par - Continue to follow up with PCP/internal medicine for BP control and medication adjustment\par - F/u in 3 months\par

## 2019-10-03 ENCOUNTER — APPOINTMENT (OUTPATIENT)
Dept: OTHER | Facility: CLINIC | Age: 64
End: 2019-10-03
Payer: COMMERCIAL

## 2019-10-03 VITALS
WEIGHT: 172 LBS | BODY MASS INDEX: 22.8 KG/M2 | HEIGHT: 73 IN | SYSTOLIC BLOOD PRESSURE: 140 MMHG | OXYGEN SATURATION: 96 % | RESPIRATION RATE: 18 BRPM | HEART RATE: 91 BPM | DIASTOLIC BLOOD PRESSURE: 70 MMHG

## 2019-10-03 VITALS — HEART RATE: 80 BPM

## 2019-10-03 DIAGNOSIS — Z84.0 FAMILY HISTORY OF DISEASES OF THE SKIN AND SUBCUTANEOUS TISSUE: ICD-10-CM

## 2019-10-03 PROCEDURE — 99203 OFFICE O/P NEW LOW 30 MIN: CPT | Mod: 25

## 2019-10-03 PROCEDURE — 99396 PREV VISIT EST AGE 40-64: CPT | Mod: 25

## 2019-10-03 RX ORDER — BENZOYL PEROXIDE 5 G/100G
5 LIQUID TOPICAL
Qty: 1 | Refills: 5 | Status: DISCONTINUED | COMMUNITY
Start: 2018-10-12 | End: 2019-10-03

## 2019-10-03 RX ORDER — CLINDAMYCIN PHOSPHATE 10 MG/ML
1 LOTION TOPICAL
Qty: 1 | Refills: 4 | Status: DISCONTINUED | COMMUNITY
Start: 2018-10-12 | End: 2019-10-03

## 2019-10-03 NOTE — DISCUSSION/SUMMARY
[Patient seen for WTC Monitoring ___] : Patient was seen for WTC monitoring [unfilled] [Please See Note in Chart and Documentation in Trial DB] : Please see note in chart and documentation in Trial DB. [FreeTextEntry3] : [facility] Monitoring Evaluation\par \par  ID#: [id number] 	               \par  Visit: 5	                        \par  Date: 10/3/19\par \par HPI: 63 yr old black male presents to [facility] for 5th annual monitoring visit. He is certified for chronic sinusitis/rhinitis, GERD, and KHURRAM. He c/o chest pressure, MEHTA+ and fatigue. He had cardiac w/u which was negative. He sleeps poorly stating its due to stress of his recent illness and surgery. Pt had partial thyroidectomy for nodules. He has occasional sinus congestion but is on no meds. He has depression but refuses meds. He is on CPAP and he is tolerating the machine. He c/o chronic ear discomfort but had it evaluated and was told there was nothing found.\par \par PCP: Dr. 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  \par \par Spirometry: Reviewed. Pt declined-recent thyroid surgery\par \par Assessment:\par HTN\par GERD\par CHronic rhinitis and sinusitis\par KHURRAM-CPAP\par S/P partial thyroidectomy for nodules-non ca\par OA knees\par \par Plan: \par CBC, CMP, lipids, UA, spirometry ordered\par F/U with PCP for chest tightness,pressure, fatigue, MEHTA+-ongoing\par No refills needed at this time\par F/U with [facility] in 1 year\par \par \par   \par \par \par \par \par \par \par \par \par

## 2019-10-03 NOTE — DISCUSSION/SUMMARY
[Patient seen for WTC Monitoring ___] : Patient was seen for WTC monitoring [unfilled] [Please See Note in Chart and Documentation in Trial DB] : Please see note in chart and documentation in Trial DB. [FreeTextEntry3] : [facility] Monitoring Evaluation\par \par  ID#: [id number] 	               \par  Visit: 5	                        \par  Date: 10/3/19\par \par HPI: 63 yr old black male presents to [facility] for 5th annual monitoring visit. He is certified for chronic sinusitis/rhinitis, GERD, and KHURRAM. He c/o chest pressure, MEHTA+ and fatigue. He had cardiac w/u which was negative. He sleeps poorly stating its due to stress of his recent illness and surgery. Pt had partial thyroidectomy for nodules. He has occasional sinus congestion but is on no meds. He has depression but refuses meds. He is on CPAP and he is tolerating the machine. He c/o chronic ear discomfort but had it evaluated and was told there was nothing found.\par \par PCP: Dr. 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  \par \par Spirometry: Reviewed. Pt declined-recent thyroid surgery\par \par Assessment:\par HTN\par GERD\par CHronic rhinitis and sinusitis\par KHURRAM-CPAP\par S/P partial thyroidectomy for nodules-non ca\par OA knees\par \par Plan: \par CBC, CMP, lipids, UA, spirometry ordered\par F/U with PCP for chest tightness,pressure, fatigue, MEHTA+-ongoing\par No refills needed at this time\par F/U with [facility] in 1 year\par \par \par   \par \par \par \par \par \par \par \par \par

## 2019-10-03 NOTE — HISTORY OF PRESENT ILLNESS
[FreeTextEntry1] : [facility]  Treatment Evaluation-first since certification\par \par  ID#: [id number] 	               \par  Visit: 5	                        \par  Date: 10/3/19\par \par HPI: 63 yr old black male presents to [facility] for 5th annual monitoring visit. He is certified for chronic sinusitis/rhinitis, GERD, and KHURRAM. He c/o chest pressure, MEHTA+ and fatigue. He had cardiac w/u which was negative. He sleeps poorly stating its due to stress of his recent illness and surgery. Pt had partial thyroidectomy for nodules. He has occasional sinus congestion but is on no meds. He has depression but refuses meds. He is on CPAP and he is tolerating the machine. He c/o chronic ear discomfort but had it evaluated and was told there was nothing found.\par \par PCP: Dr. 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  \par \par Spirometry: Reviewed. Pt declined-recent thyroid surgery\par \par Assessment:\par HTN\par GERD\par CHronic rhinitis and sinusitis\par KHURRAM-CPAP\par S/P partial thyroidectomy for nodules-non ca\par OA knees\par \par Plan: \par CBC, CMP, lipids, UA, spirometry ordered\par F/U with PCP for chest tightness,pressure, fatigue, MEHTA+-ongoing\par No refills needed at this time\par F/U with [facility] in 1 year

## 2019-10-03 NOTE — HISTORY OF PRESENT ILLNESS
[FreeTextEntry1] : [facility]  Treatment Evaluation-first since certification\par \par  ID#: [id number] 	               \par  Visit: 5	                        \par  Date: 10/3/19\par \par HPI: 63 yr old black male presents to [facility] for 5th annual monitoring visit. He is certified for chronic sinusitis/rhinitis, GERD, and KHURRAM. He c/o chest pressure, MEHTA+ and fatigue. He had cardiac w/u which was negative. He sleeps poorly stating its due to stress of his recent illness and surgery. Pt had partial thyroidectomy for nodules. He has occasional sinus congestion but is on no meds. He has depression but refuses meds. He is on CPAP and he is tolerating the machine. He c/o chronic ear discomfort but had it evaluated and was told there was nothing found.\par \par PCP: Dr. 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  \par \par Spirometry: Reviewed. Pt declined-recent thyroid surgery\par \par Assessment:\par HTN\par GERD\par CHronic rhinitis and sinusitis\par KHURRAM-CPAP\par S/P partial thyroidectomy for nodules-non ca\par OA knees\par \par Plan: \par CBC, CMP, lipids, UA, spirometry ordered\par F/U with PCP for chest tightness,pressure, fatigue, MEHTA+-ongoing\par No refills needed at this time\par F/U with [facility] in 1 year

## 2019-10-04 ENCOUNTER — RESULT REVIEW (OUTPATIENT)
Age: 64
End: 2019-10-04

## 2019-10-04 LAB
ALBUMIN SERPL ELPH-MCNC: 4.4 G/DL
ALP BLD-CCNC: 57 U/L
ALT SERPL-CCNC: 9 U/L
ANION GAP SERPL CALC-SCNC: 12 MMOL/L
APPEARANCE: CLEAR
AST SERPL-CCNC: 17 U/L
BACTERIA: NEGATIVE
BASOPHILS # BLD AUTO: 0.03 K/UL
BASOPHILS NFR BLD AUTO: 0.7 %
BILIRUB SERPL-MCNC: 0.2 MG/DL
BILIRUBIN URINE: NEGATIVE
BLOOD URINE: ABNORMAL
BUN SERPL-MCNC: 23 MG/DL
CALCIUM SERPL-MCNC: 9.8 MG/DL
CHLORIDE SERPL-SCNC: 105 MMOL/L
CHOLEST SERPL-MCNC: 129 MG/DL
CHOLEST/HDLC SERPL: 2.6 RATIO
CO2 SERPL-SCNC: 27 MMOL/L
COLOR: YELLOW
CREAT SERPL-MCNC: 1.31 MG/DL
EOSINOPHIL # BLD AUTO: 0.26 K/UL
EOSINOPHIL NFR BLD AUTO: 6 %
GLUCOSE QUALITATIVE U: NEGATIVE
GLUCOSE SERPL-MCNC: 94 MG/DL
HCT VFR BLD CALC: 31.6 %
HDLC SERPL-MCNC: 49 MG/DL
HGB BLD-MCNC: 9.8 G/DL
HYALINE CASTS: 2 /LPF
IMM GRANULOCYTES NFR BLD AUTO: 0.2 %
KETONES URINE: NEGATIVE
LDLC SERPL CALC-MCNC: 63 MG/DL
LEUKOCYTE ESTERASE URINE: NEGATIVE
LYMPHOCYTES # BLD AUTO: 1.41 K/UL
LYMPHOCYTES NFR BLD AUTO: 32.3 %
MAN DIFF?: NORMAL
MCHC RBC-ENTMCNC: 25.3 PG
MCHC RBC-ENTMCNC: 31 GM/DL
MCV RBC AUTO: 81.7 FL
MICROSCOPIC-UA: NORMAL
MONOCYTES # BLD AUTO: 0.41 K/UL
MONOCYTES NFR BLD AUTO: 9.4 %
NEUTROPHILS # BLD AUTO: 2.24 K/UL
NEUTROPHILS NFR BLD AUTO: 51.4 %
NITRITE URINE: NEGATIVE
PH URINE: 6
PLATELET # BLD AUTO: 338 K/UL
POTASSIUM SERPL-SCNC: 4.2 MMOL/L
PROT SERPL-MCNC: 7.1 G/DL
PROTEIN URINE: ABNORMAL
RBC # BLD: 3.87 M/UL
RBC # FLD: 15.7 %
RED BLOOD CELLS URINE: 88 /HPF
SODIUM SERPL-SCNC: 144 MMOL/L
SPECIFIC GRAVITY URINE: 1.03
SQUAMOUS EPITHELIAL CELLS: 1 /HPF
TRIGL SERPL-MCNC: 84 MG/DL
UROBILINOGEN URINE: ABNORMAL
WBC # FLD AUTO: 4.36 K/UL
WHITE BLOOD CELLS URINE: 2 /HPF

## 2019-10-08 ENCOUNTER — RESULT REVIEW (OUTPATIENT)
Age: 64
End: 2019-10-08

## 2019-10-08 NOTE — PROGRESS NOTE ADULT - SUBJECTIVE AND OBJECTIVE BOX
Otolaryngology - Head & Neck Surgery Progress Note    Brief HPI  63M with hx of KHURRAM admitted for observation s/p left hemithyroidectomy for compressive MNG.     Hospital Course  9/10: Patient admitted for L hemithyroidectomy. Initial surgery was uncomplicated. Was noted to be hypertensive in PACU, poorly responded to home meds. Over the course of the evening, became increasingly dyspneic and developed increasing left neck fullness. Was subsequently taken back to OR for awake intubation, neck exploration, evacuation of hematoma and control of bleeding. Was transferred to PACU intubated, in stable condition.  BP well controlled on nicardipine drip started in OR.  9/11: NAEON, remained on cardene gtt in am, extubated successfully. Failed SLP eval and po trial, remained NPO. Voice improved. Changed to labetalol/hydralazine alternating, dc cardene gtt. Remained on bedrest.  9/12: NAEON, remains on alternating hydralazine/labetalol pending SLP evaluation for adv diet and po BP medication. Remained with frye and in bed in am.  9/13: NAEON. SLP eval this AM. Cleared for slick puree diet. Restarted on PO BP meds with improvement.   9/14: NAEON. BP remained well controlled on PO BP meds. Downgraded from SICU to floor. Tolerating PO puree diet.       MEDICATIONS  (STANDING):  amLODIPine   Tablet 10 milliGRAM(s) Oral daily  chlorhexidine 0.12% Liquid 15 milliLiter(s) Oral Mucosa every 12 hours  chlorhexidine 4% Liquid 1 Application(s) Topical <User Schedule>  hydrALAZINE 25 milliGRAM(s) Oral every 8 hours  influenza   Vaccine 0.5 milliLiter(s) IntraMuscular once  labetalol 200 milliGRAM(s) Oral every 8 hours  pantoprazole  Injectable 40 milliGRAM(s) IV Push daily  potassium acid phosphate/sodium acid phosphate tablet (K-PHOS No. 2) 1 Tablet(s) Oral four times a day with meals    MEDICATIONS  (PRN):  acetaminophen    Suspension .. 650 milliGRAM(s) Oral every 6 hours PRN Mild Pain (1 - 3)  oxyCODONE    5 mG/acetaminophen 325 mG 1 Tablet(s) Oral every 4 hours PRN Moderate Pain (4 - 6)  oxyCODONE    5 mG/acetaminophen 325 mG 2 Tablet(s) Oral every 6 hours PRN Severe Pain (7 - 10)    Vital Signs Last 24 Hrs  T(C): 37.2 (14 Sep 2019 13:53), Max: 37.3 (13 Sep 2019 21:32)  T(F): 98.9 (14 Sep 2019 13:53), Max: 99.2 (13 Sep 2019 21:32)  HR: 82 (14 Sep 2019 17:00) (60 - 90)  BP: 133/68 (14 Sep 2019 17:00) (123/69 - 189/84)  BP(mean): 94 (14 Sep 2019 17:00) (93 - 126)  RR: 16 (14 Sep 2019 17:00) (8 - 22)  SpO2: 99% (14 Sep 2019 17:00) (97% - 100%)    Physical Exam  NAD, A&Ox3  Nonlabored breathing on RA, tolerating secretions  No stridor or stertor  Voice strong and clear.   Face symmetric  Neck supple, soft flat no palpable collection  Neck incisions c/d/i, ZACK to R neck draining ss output      A/P:  63M s/p left hemithyroidectomy for MNG with compressive symptoms. Postop course complicated by hypertension and expanding neck hematoma s/p neck exploration and evacuation of hematoma. Extubated successfully, however failed SLP after extubation. Repeat SLP eval today; cleared for dickson horvath. Was restarted on PO meds with improvement in BP.  - OK to downgrade to SDU  -Will obtain medicine consult for BP recs  -Ancef x 7 days  -monitor ZACK output  -[facility] with further questions/concerns [Clean] : clean [Dry] : dry [Healing Well] : healing well [No Drainage] : without drainage [Normal Skin] : normal [Normal Skin Turgor] : skin turgor was normal [FreeTextEntry1] : Right thigh incision [FreeTextEntry6] : steri strips.  There is a mylicon suture sticking out at the proximal end of the incision. [Person] : oriented to person [Place] : oriented to place [Time] : oriented to time [Short Term Intact] : short term memory intact [Span Intact] : the attention span was normal [Remote Intact] : remote memory intact [Fluency] : fluency intact [Concentration Intact] : normal concentrating ability [Comprehension] : comprehension intact [Current Events] : adequate knowledge of current events [Past History] : adequate knowledge of personal past history [Vocabulary] : adequate range of vocabulary [Cranial Nerves Trigeminal (V)] : facial sensation intact symmetrically [Cranial Nerves Oculomotor (III)] : extraocular motion intact [Cranial Nerves Facial (VII)] : face symmetrical [Cranial Nerves Vestibulocochlear (VIII)] : hearing was intact bilaterally [Cranial Nerves Glossopharyngeal (IX)] : tongue and palate midline [Cranial Nerves Accessory (XI - Cranial And Spinal)] : head turning and shoulder shrug symmetric [Cranial Nerves Hypoglossal (XII)] : there was no tongue deviation with protrusion [Motor Tone] : muscle tone was normal in all four extremities [Motor Strength] : muscle strength was normal in all four extremities [No Muscle Atrophy] : normal bulk in all four extremities [Sensation Tactile Decrease] : light touch was intact [Abnormal Walk] : normal gait [Balance] : balance was intact [Past-pointing] : there was no past-pointing [Tremor] : no tremor present [2+] : Patella left 2+ [FreeTextEntry5] : The patient exhibits a bitemporal field cut on confrontational exam.

## 2019-10-10 ENCOUNTER — FORM ENCOUNTER (OUTPATIENT)
Age: 64
End: 2019-10-10

## 2019-10-29 ENCOUNTER — APPOINTMENT (OUTPATIENT)
Dept: UROLOGY | Facility: CLINIC | Age: 64
End: 2019-10-29
Payer: COMMERCIAL

## 2019-10-29 VITALS
WEIGHT: 170 LBS | HEART RATE: 63 BPM | DIASTOLIC BLOOD PRESSURE: 88 MMHG | BODY MASS INDEX: 22.53 KG/M2 | RESPIRATION RATE: 18 BRPM | SYSTOLIC BLOOD PRESSURE: 153 MMHG | TEMPERATURE: 98.2 F | HEIGHT: 73 IN

## 2019-10-29 DIAGNOSIS — Z78.9 OTHER SPECIFIED HEALTH STATUS: ICD-10-CM

## 2019-10-29 DIAGNOSIS — N52.01 ERECTILE DYSFUNCTION DUE TO ARTERIAL INSUFFICIENCY: ICD-10-CM

## 2019-10-29 PROCEDURE — 99204 OFFICE O/P NEW MOD 45 MIN: CPT

## 2019-10-29 RX ORDER — SILDENAFIL 100 MG/1
100 TABLET, FILM COATED ORAL DAILY
Qty: 10 | Refills: 11 | Status: ACTIVE | COMMUNITY
Start: 2019-10-29 | End: 1900-01-01

## 2019-10-29 NOTE — PHYSICAL EXAM
[General Appearance - Well Developed] : well developed [General Appearance - Well Nourished] : well nourished [Normal Appearance] : normal appearance [Well Groomed] : well groomed [General Appearance - In No Acute Distress] : no acute distress [Edema] : no peripheral edema [Respiration, Rhythm And Depth] : normal respiratory rhythm and effort [Exaggerated Use Of Accessory Muscles For Inspiration] : no accessory muscle use [Abdomen Soft] : soft [Abdomen Tenderness] : non-tender [Costovertebral Angle Tenderness] : no ~M costovertebral angle tenderness [Urethral Meatus] : meatus normal [Urinary Bladder Findings] : the bladder was normal on palpation [Scrotum] : the scrotum was normal [Testes Mass (___cm)] : there were no testicular masses [No Prostate Nodules] : no prostate nodules [Normal Station and Gait] : the gait and station were normal for the patient's age [] : no rash [No Focal Deficits] : no focal deficits [Oriented To Time, Place, And Person] : oriented to person, place, and time [Affect] : the affect was normal [Mood] : the mood was normal [Not Anxious] : not anxious [No Palpable Adenopathy] : no palpable adenopathy [FreeTextEntry1] : PVR 2 mL

## 2019-10-29 NOTE — ASSESSMENT
[FreeTextEntry1] : Microscopic hematuria: Discussed etiology of blood in the urine.  Recommend CT urogram and office cystoscopy to evaluate the urinary tract.\par \par BPH: Recommend start tamsulosin 0.4 mg once daily.\par \par ED: Recommend start sildenafil  mg.\par \par Goals of medications reviewed.  Discussed the potential adverse effects of the medication.  Discussed the proper administration of the medication.

## 2019-10-29 NOTE — HISTORY OF PRESENT ILLNESS
[FreeTextEntry1] : Referred by the [facility] program.  Recent urinalysis showed microscopic hematuria, 88 red blood cells in the urine.  He denies gross hematuria.\par \par BPH: Saw a urologist about 4 5 years ago.  He underwent a office-based prostate procedure which was not successful.  He then underwent a GreenLight laser vaporization of the prostate 4 years ago.  His symptoms improved somewhat however over the last 12 months,  he complains of weak urinary stream, nocturia 3-4 times per night, urgency and feeling of incomplete bladder emptying.\par \par ED: He has tried Cialis in the past without success.  He had previously been given a prescription for sildenafil and felt that the medication was more effective.  His ED symptoms have been present for last 3 to 4 years.  He reports difficulty obtaining a full erection and difficulty maintaining a full erection.\par

## 2019-10-29 NOTE — REVIEW OF SYSTEMS
[Poor quality erections] : Poor quality erections [Blood in urine that you can see] : blood visible in urine [Slow urine stream] : slow urine stream [Leakage of urine with urgency] : leakage of urine with urgency [Negative] : Heme/Lymph

## 2019-11-01 LAB
APPEARANCE: CLEAR
BACTERIA: NEGATIVE
BILIRUBIN URINE: NEGATIVE
BLOOD URINE: NEGATIVE
CALCIUM OXALATE CRYSTALS: ABNORMAL
COLOR: YELLOW
GLUCOSE QUALITATIVE U: NEGATIVE
HYALINE CASTS: 1 /LPF
KETONES URINE: NEGATIVE
LEUKOCYTE ESTERASE URINE: NEGATIVE
MICROSCOPIC-UA: NORMAL
NITRITE URINE: NEGATIVE
PH URINE: 6
PROTEIN URINE: ABNORMAL
PSA FREE FLD-MCNC: 28 %
PSA FREE SERPL-MCNC: 0.3 NG/ML
PSA SERPL-MCNC: 1.06 NG/ML
RED BLOOD CELLS URINE: 1 /HPF
SPECIFIC GRAVITY URINE: 1.03
SQUAMOUS EPITHELIAL CELLS: 3 /HPF
UROBILINOGEN URINE: NORMAL
WHITE BLOOD CELLS URINE: 3 /HPF

## 2019-11-05 ENCOUNTER — OTHER (OUTPATIENT)
Age: 64
End: 2019-11-05

## 2019-11-12 ENCOUNTER — APPOINTMENT (OUTPATIENT)
Dept: GASTROENTEROLOGY | Facility: CLINIC | Age: 64
End: 2019-11-12
Payer: COMMERCIAL

## 2019-11-12 VITALS
HEIGHT: 72 IN | HEART RATE: 68 BPM | WEIGHT: 185 LBS | BODY MASS INDEX: 25.06 KG/M2 | OXYGEN SATURATION: 99 % | SYSTOLIC BLOOD PRESSURE: 120 MMHG | TEMPERATURE: 98.7 F | DIASTOLIC BLOOD PRESSURE: 70 MMHG

## 2019-11-12 DIAGNOSIS — Z86.79 PERSONAL HISTORY OF OTHER DISEASES OF THE CIRCULATORY SYSTEM: ICD-10-CM

## 2019-11-12 DIAGNOSIS — D64.9 ANEMIA, UNSPECIFIED: ICD-10-CM

## 2019-11-12 DIAGNOSIS — K59.00 CONSTIPATION, UNSPECIFIED: ICD-10-CM

## 2019-11-12 DIAGNOSIS — R12 HEARTBURN: ICD-10-CM

## 2019-11-12 DIAGNOSIS — G47.33 OBSTRUCTIVE SLEEP APNEA (ADULT) (PEDIATRIC): ICD-10-CM

## 2019-11-12 DIAGNOSIS — Z98.84 BARIATRIC SURGERY STATUS: ICD-10-CM

## 2019-11-12 DIAGNOSIS — Z12.11 ENCOUNTER FOR SCREENING FOR MALIGNANT NEOPLASM OF COLON: ICD-10-CM

## 2019-11-12 DIAGNOSIS — Z80.0 FAMILY HISTORY OF MALIGNANT NEOPLASM OF DIGESTIVE ORGANS: ICD-10-CM

## 2019-11-12 DIAGNOSIS — Z80.9 FAMILY HISTORY OF MALIGNANT NEOPLASM, UNSPECIFIED: ICD-10-CM

## 2019-11-12 DIAGNOSIS — Z12.12 ENCOUNTER FOR SCREENING FOR MALIGNANT NEOPLASM OF COLON: ICD-10-CM

## 2019-11-12 PROCEDURE — 99205 OFFICE O/P NEW HI 60 MIN: CPT

## 2019-11-12 RX ORDER — NEBIVOLOL HYDROCHLORIDE 20 MG/1
20 TABLET ORAL
Refills: 0 | Status: DISCONTINUED | COMMUNITY
End: 2019-11-12

## 2019-11-12 NOTE — PHYSICAL EXAM
[General Appearance - Alert] : alert [General Appearance - In No Acute Distress] : in no acute distress [Sclera] : the sclera and conjunctiva were normal [PERRL With Normal Accommodation] : pupils were equal in size, round, and reactive to light [Outer Ear] : the ears and nose were normal in appearance [Extraocular Movements] : extraocular movements were intact [Oropharynx] : the oropharynx was normal [Neck Appearance] : the appearance of the neck was normal [Neck Cervical Mass (___cm)] : no neck mass was observed [Thyroid Diffuse Enlargement] : the thyroid was not enlarged [Jugular Venous Distention Increased] : there was no jugular-venous distention [Thyroid Nodule] : there were no palpable thyroid nodules [Auscultation Breath Sounds / Voice Sounds] : lungs were clear to auscultation bilaterally [Heart Sounds] : normal S1 and S2 [Heart Rate And Rhythm] : heart rate was normal and rhythm regular [Heart Sounds Gallop] : no gallops [Murmurs] : no murmurs [Heart Sounds Pericardial Friction Rub] : no pericardial rub [Bowel Sounds] : normal bowel sounds [Abdomen Soft] : soft [Abdomen Tenderness] : non-tender [] : no hepato-splenomegaly [Abdomen Mass (___ Cm)] : no abdominal mass palpated [Supraclavicular Lymph Nodes Enlarged Bilaterally] : supraclavicular [Cervical Lymph Nodes Enlarged Anterior Bilaterally] : anterior cervical [Cervical Lymph Nodes Enlarged Posterior Bilaterally] : posterior cervical [No CVA Tenderness] : no ~M costovertebral angle tenderness [No Spinal Tenderness] : no spinal tenderness [Nail Clubbing] : no clubbing  or cyanosis of the fingernails [Abnormal Walk] : normal gait [Musculoskeletal - Swelling] : no joint swelling seen [Deep Tendon Reflexes (DTR)] : deep tendon reflexes were 2+ and symmetric [Motor Tone] : muscle strength and tone were normal [Sensation] : the sensory exam was normal to light touch and pinprick [Oriented To Time, Place, And Person] : oriented to person, place, and time [No Focal Deficits] : no focal deficits [Impaired Insight] : insight and judgment were intact [Affect] : the affect was normal

## 2019-11-12 NOTE — HISTORY OF PRESENT ILLNESS
[Heartburn] : stable heartburn [Nausea] : denies nausea [Vomiting] : denies vomiting [Diarrhea] : denies diarrhea [Constipation] : stable constipation [Yellow Skin Or Eyes (Jaundice)] : denies jaundice [Abdominal Pain] : denies abdominal pain [Abdominal Swelling] : denies abdominal swelling [Rectal Pain] : denies rectal pain [GERD] : gastroesophageal reflux disease [Pancreatitis] : pancreatitis [Hiatus Hernia] : no hiatus hernia [Wt Gain ___ Lbs] : no recent weight gain [Wt Loss ___ Lbs] : no recent weight loss [Peptic Ulcer Disease] : no peptic ulcer disease [Cholelithiasis] : no cholelithiasis [Inflammatory Bowel Disease] : no inflammatory bowel disease [Kidney Stone] : no kidney stone [Irritable Bowel Syndrome] : no irritable bowel syndrome [Diverticulitis] : no diverticulitis [Alcohol Abuse] : no alcohol abuse [Malignancy] : no malignancy [Appendectomy] : no appendectomy [Abdominal Surgery] : no abdominal surgery [Cholecystectomy] : no cholecystectomy [de-identified] : 64 year old man referred for a screening colonoscopy. His last colonoscopy in 1/2013 was normal according to the patient. The report is not available. He has a strong family history of colon cancer in his father and brother. He is s/p St thyroidectomy for a goiter 1 month ago. He is s/p bilateral knee replacements in 2016. He denies rectal bleeding, melena or hematemesis. He also complains of GERD controlled with Pantoprazole. He has microscopic hematuria and recently had a drop in his Hgb/ Hct with no obvious source. he underwent a sleeve gastrectomy 2 years go and has lost 100 IBS.

## 2019-11-12 NOTE — CONSULT LETTER
[Dear  ___] : Dear  [unfilled], [Consult Letter:] : I had the pleasure of evaluating your patient, [unfilled]. [Consult Closing:] : Thank you very much for allowing me to participate in the care of this patient.  If you have any questions, please do not hesitate to contact me. [Please see my note below.] : Please see my note below. [Sincerely,] : Sincerely, [FreeTextEntry3] : Elpidio Batista MD, FACG\par

## 2019-11-12 NOTE — ASSESSMENT
[FreeTextEntry1] : Needs prophylactic antibiotics for the endoscopic procedures due to recent knee replacements.

## 2019-11-18 ENCOUNTER — FORM ENCOUNTER (OUTPATIENT)
Age: 64
End: 2019-11-18

## 2019-11-19 ENCOUNTER — APPOINTMENT (OUTPATIENT)
Dept: UROLOGY | Facility: CLINIC | Age: 64
End: 2019-11-19
Payer: COMMERCIAL

## 2019-11-19 ENCOUNTER — OUTPATIENT (OUTPATIENT)
Dept: OUTPATIENT SERVICES | Facility: HOSPITAL | Age: 64
LOS: 1 days | End: 2019-11-19
Payer: COMMERCIAL

## 2019-11-19 ENCOUNTER — APPOINTMENT (OUTPATIENT)
Dept: CT IMAGING | Facility: IMAGING CENTER | Age: 64
End: 2019-11-19
Payer: COMMERCIAL

## 2019-11-19 VITALS — SYSTOLIC BLOOD PRESSURE: 137 MMHG | DIASTOLIC BLOOD PRESSURE: 80 MMHG | TEMPERATURE: 98.4 F | HEART RATE: 85 BPM

## 2019-11-19 DIAGNOSIS — Z96.659 PRESENCE OF UNSPECIFIED ARTIFICIAL KNEE JOINT: Chronic | ICD-10-CM

## 2019-11-19 DIAGNOSIS — M67.00 SHORT ACHILLES TENDON (ACQUIRED), UNSPECIFIED ANKLE: Chronic | ICD-10-CM

## 2019-11-19 DIAGNOSIS — R31.21 ASYMPTOMATIC MICROSCOPIC HEMATURIA: ICD-10-CM

## 2019-11-19 DIAGNOSIS — Z96.651 PRESENCE OF RIGHT ARTIFICIAL KNEE JOINT: Chronic | ICD-10-CM

## 2019-11-19 DIAGNOSIS — R35.0 FREQUENCY OF MICTURITION: ICD-10-CM

## 2019-11-19 DIAGNOSIS — Z98.890 OTHER SPECIFIED POSTPROCEDURAL STATES: Chronic | ICD-10-CM

## 2019-11-19 DIAGNOSIS — Z90.3 ACQUIRED ABSENCE OF STOMACH [PART OF]: Chronic | ICD-10-CM

## 2019-11-19 PROCEDURE — 99214 OFFICE O/P EST MOD 30 MIN: CPT | Mod: 25

## 2019-11-19 PROCEDURE — 74178 CT ABD&PLV WO CNTR FLWD CNTR: CPT

## 2019-11-19 PROCEDURE — 52000 CYSTOURETHROSCOPY: CPT

## 2019-11-19 PROCEDURE — 74178 CT ABD&PLV WO CNTR FLWD CNTR: CPT | Mod: 26

## 2019-11-19 NOTE — ASSESSMENT
[FreeTextEntry1] : Long discussion today regarding treatment of BPH.  Discussed medical therapy options including adding a 5 alpha reductase inhibitor.  However he has pre-existing erectile dysfunction and does not want any medication which may exacerbate his symptoms.\par \par Given his prostate anatomy, I recommended a transurethral resection of the prostate.\par With regards to surgery, reviewed the operative time, expected hospital stay and recovery time.  Discussed the potential immediate complications of bleeding, infection, electrolyte abnormalities, extended need for urethral catheterization, DVT/PE, and anesthetic complications.  Discussed the long term risks including stress urinary incontinence, erectile dysfunction and overactive bladder symptoms requiring medication. \par \par All questions were answered.  Patient agrees to proceed as recommended.\par

## 2019-11-19 NOTE — HISTORY OF PRESENT ILLNESS
[FreeTextEntry1] : Referred by the [facility] program.  Recent urinalysis showed microscopic hematuria, 88 red blood cells in the urine.  He denies gross hematuria.  CT urogram and office cystoscopy done today.  No upper tract abnormalities, bladder mucosa was normal.\par \par BPH: Saw a urologist about 4 5 years ago.  He underwent a office-based prostate procedure which was not successful.  He then underwent a GreenLight laser vaporization of the prostate 4 years ago.  His symptoms improved somewhat however over the last 12 months,  he complains of weak urinary stream, nocturia 3-4 times per night, urgency and feeling of incomplete bladder emptying.  Was started on tamsulosin 0.4 mg of his last visit several weeks ago.  However, he has not experienced improvement in his urinary symptoms.  On office cystoscopy today he was found to have trilobar hypertrophy with a large obstructing median lobe.\par \par

## 2019-11-26 DIAGNOSIS — N40.1 BENIGN PROSTATIC HYPERPLASIA WITH LOWER URINARY TRACT SYMPTOMS: ICD-10-CM

## 2019-11-26 DIAGNOSIS — R31.21 ASYMPTOMATIC MICROSCOPIC HEMATURIA: ICD-10-CM

## 2019-12-18 ENCOUNTER — APPOINTMENT (OUTPATIENT)
Dept: OTOLARYNGOLOGY | Facility: CLINIC | Age: 64
End: 2019-12-18

## 2019-12-23 NOTE — CONSULT NOTE ADULT - NSHPATTENDINGPLANDISCUSS_GEN_ALL_CORE
Patient ID: Jeanine Ding     as above

## 2020-01-08 ENCOUNTER — APPOINTMENT (OUTPATIENT)
Dept: GASTROENTEROLOGY | Facility: AMBULATORY MEDICAL SERVICES | Age: 65
End: 2020-01-08

## 2020-01-22 ENCOUNTER — OUTPATIENT (OUTPATIENT)
Dept: OUTPATIENT SERVICES | Facility: HOSPITAL | Age: 65
LOS: 1 days | End: 2020-01-22

## 2020-01-22 VITALS
HEIGHT: 71.5 IN | SYSTOLIC BLOOD PRESSURE: 130 MMHG | HEART RATE: 64 BPM | OXYGEN SATURATION: 98 % | RESPIRATION RATE: 16 BRPM | WEIGHT: 199.96 LBS | TEMPERATURE: 97 F | DIASTOLIC BLOOD PRESSURE: 78 MMHG

## 2020-01-22 DIAGNOSIS — Z98.890 OTHER SPECIFIED POSTPROCEDURAL STATES: Chronic | ICD-10-CM

## 2020-01-22 DIAGNOSIS — Z96.651 PRESENCE OF RIGHT ARTIFICIAL KNEE JOINT: Chronic | ICD-10-CM

## 2020-01-22 DIAGNOSIS — Z90.3 ACQUIRED ABSENCE OF STOMACH [PART OF]: Chronic | ICD-10-CM

## 2020-01-22 DIAGNOSIS — Z01.818 ENCOUNTER FOR OTHER PREPROCEDURAL EXAMINATION: ICD-10-CM

## 2020-01-22 DIAGNOSIS — N40.1 BENIGN PROSTATIC HYPERPLASIA WITH LOWER URINARY TRACT SYMPTOMS: ICD-10-CM

## 2020-01-22 DIAGNOSIS — I10 ESSENTIAL (PRIMARY) HYPERTENSION: ICD-10-CM

## 2020-01-22 DIAGNOSIS — Z96.659 PRESENCE OF UNSPECIFIED ARTIFICIAL KNEE JOINT: Chronic | ICD-10-CM

## 2020-01-22 DIAGNOSIS — M67.00 SHORT ACHILLES TENDON (ACQUIRED), UNSPECIFIED ANKLE: Chronic | ICD-10-CM

## 2020-01-22 DIAGNOSIS — K21.9 GASTRO-ESOPHAGEAL REFLUX DISEASE WITHOUT ESOPHAGITIS: ICD-10-CM

## 2020-01-22 LAB
ANION GAP SERPL CALC-SCNC: 10 MMO/L — SIGNIFICANT CHANGE UP (ref 7–14)
BUN SERPL-MCNC: 13 MG/DL — SIGNIFICANT CHANGE UP (ref 7–23)
CALCIUM SERPL-MCNC: 9.1 MG/DL — SIGNIFICANT CHANGE UP (ref 8.4–10.5)
CHLORIDE SERPL-SCNC: 107 MMOL/L — SIGNIFICANT CHANGE UP (ref 98–107)
CO2 SERPL-SCNC: 27 MMOL/L — SIGNIFICANT CHANGE UP (ref 22–31)
CREAT SERPL-MCNC: 1.14 MG/DL — SIGNIFICANT CHANGE UP (ref 0.5–1.3)
GLUCOSE SERPL-MCNC: 109 MG/DL — HIGH (ref 70–99)
HBA1C BLD-MCNC: 5.7 % — HIGH (ref 4–5.6)
HCT VFR BLD CALC: 34.9 % — LOW (ref 39–50)
HGB BLD-MCNC: 11 G/DL — LOW (ref 13–17)
MCHC RBC-ENTMCNC: 25 PG — LOW (ref 27–34)
MCHC RBC-ENTMCNC: 31.5 % — LOW (ref 32–36)
MCV RBC AUTO: 79.3 FL — LOW (ref 80–100)
NRBC # FLD: 0 K/UL — SIGNIFICANT CHANGE UP (ref 0–0)
PLATELET # BLD AUTO: 230 K/UL — SIGNIFICANT CHANGE UP (ref 150–400)
PMV BLD: 10.4 FL — SIGNIFICANT CHANGE UP (ref 7–13)
POTASSIUM SERPL-MCNC: 4.1 MMOL/L — SIGNIFICANT CHANGE UP (ref 3.5–5.3)
POTASSIUM SERPL-SCNC: 4.1 MMOL/L — SIGNIFICANT CHANGE UP (ref 3.5–5.3)
RBC # BLD: 4.4 M/UL — SIGNIFICANT CHANGE UP (ref 4.2–5.8)
RBC # FLD: 15.1 % — HIGH (ref 10.3–14.5)
SODIUM SERPL-SCNC: 144 MMOL/L — SIGNIFICANT CHANGE UP (ref 135–145)
WBC # BLD: 3.63 K/UL — LOW (ref 3.8–10.5)
WBC # FLD AUTO: 3.63 K/UL — LOW (ref 3.8–10.5)

## 2020-01-22 RX ORDER — PANTOPRAZOLE SODIUM 20 MG/1
1 TABLET, DELAYED RELEASE ORAL
Qty: 0 | Refills: 0 | DISCHARGE

## 2020-01-22 NOTE — H&P PST ADULT - MUSCULOSKELETAL COMMENTS
Patient reports herniated discs from accident on his job- Dr Díaz orthopedic physician manages his neck and back pain h/o chronic neck and back pain

## 2020-01-22 NOTE — H&P PST ADULT - NEGATIVE NEUROLOGICAL SYMPTOMS
no transient paralysis/no paresthesias/no generalized seizures/no vertigo/no loss of sensation/no difficulty walking/no syncope/no tremors/no focal seizures/no headache

## 2020-01-22 NOTE — H&P PST ADULT - NSICDXPASTSURGICALHX_GEN_ALL_CORE_FT
PAST SURGICAL HISTORY:  Achilles tendon contracture     H/O total knee replacement left 7/9/2019,    History of colonoscopy 2017-normal    History of sleeve gastrectomy 2017- lost 120 lbs    History of total knee replacement, right 4/2019

## 2020-01-22 NOTE — H&P PST ADULT - NEGATIVE OPHTHALMOLOGIC SYMPTOMS
no discharge L/no pain R/no loss of vision L/no loss of vision R/no photophobia/no diplopia/no discharge R/no blurred vision L/no blurred vision R/no pain L

## 2020-01-22 NOTE — H&P PST ADULT - NSANTHOSAYNRD_GEN_A_CORE
No. KHURRAM screening performed.  STOP BANG Legend: 0-2 = LOW Risk; 3-4 = INTERMEDIATE Risk; 5-8 = HIGH Risk/had H/O , but has since not needed CPAP after 125 lb weight loss

## 2020-01-22 NOTE — H&P PST ADULT - MUSCULOSKELETAL
details… detailed exam no joint swelling/decreased ROM due to pain/no joint erythema/no joint warmth/no calf tenderness

## 2020-01-22 NOTE — H&P PST ADULT - HISTORY OF PRESENT ILLNESS
Enlarged Prostate with lower tract symptoms 64 year old male with history of enlarged Prostate with lower tract symptoms presents to [facility] for evaluation for scheduled cystoscopy, transurethral resection of the prostate on 1/24/2020. Patient reports having worsening symptoms.

## 2020-01-22 NOTE — H&P PST ADULT - NSICDXPASTMEDICALHX_GEN_ALL_CORE_FT
PAST MEDICAL HISTORY:  Chronic GERD     DM (diabetes mellitus) history of. stopped medications since weight  loss surgery    Enlarged prostate with lower urinary tract symptoms (LUTS)     HTN (hypertension)     Hyperlipidemia History of. Off medications since weight loss surgery    Morbid obesity     OA (osteoarthritis) of knee bilateral replaced    Sleep apnea CPAP Machine    Thyroid nodule     Torn rotator cuff bilateral PAST MEDICAL HISTORY:  Chronic GERD     DM (diabetes mellitus) history of. stopped medications since weight  loss surgery  as of 10/2019    Enlarged prostate with lower urinary tract symptoms (LUTS)     HTN (hypertension)     Hyperlipidemia History of. Off medications since weight loss surgery    Morbid obesity     OA (osteoarthritis) of knee bilateral replaced    Sleep apnea CPAP Machine- stopped using CPAP as per Dr Sparrow - sleep study doctor, can't remeber the date    Thyroid nodule     Torn rotator cuff bilateral

## 2020-01-22 NOTE — H&P PST ADULT - RS GEN PE MLT RESP DETAILS PC
respirations non-labored/no rhonchi/no wheezes/good air movement/airway patent/breath sounds equal/clear to auscultation bilaterally/no rales

## 2020-01-22 NOTE — H&P PST ADULT - NSICDXPROBLEM_GEN_ALL_CORE_FT
PROBLEM DIAGNOSES  Problem: Chronic GERD  Assessment and Plan: Pt instructed to take  his pantoprazole the  AM of surgery with sips of water, pt able to verbalize understanding.     Problem: HTN (hypertension)  Assessment and Plan: Pt instructed to take his amlodipine the AM of surgery with sips of water, pt able to verbalize understanding.     Problem: Enlarged prostate with lower urinary tract symptoms (LUTS)  Assessment and Plan: Patient scheduled for cystoscopy, transurethral resectiion of the prostate on 1/24/2020. Pre-op instructions provided. Pt given verbal and writter instructions with teach back on  taking his own pantoprazole. . Pt verbalized understanding with return demonstration.   Patient with history of hypertension, type 2 DM, surgeon requested medical evaluation, which patient had 2 weeks ago with his PCP Dr. Bella. Patient reports having echo and stress test, and EKG done at that time,  PST will request results, medical eval in the chart.  KHURRAM precautions, OR booking notified.   PMH diabetes, OR booking notified.

## 2020-01-23 ENCOUNTER — TRANSCRIPTION ENCOUNTER (OUTPATIENT)
Age: 65
End: 2020-01-23

## 2020-01-23 LAB
BACTERIA UR CULT: SIGNIFICANT CHANGE UP
SPECIMEN SOURCE: SIGNIFICANT CHANGE UP

## 2020-01-23 NOTE — ASU PATIENT PROFILE, ADULT - PMH
Chronic GERD    DM (diabetes mellitus)  history of. stopped medications since weight  loss surgery  as of 10/2019  Enlarged prostate with lower urinary tract symptoms (LUTS)    HTN (hypertension)    Hyperlipidemia  History of. Off medications since weight loss surgery  Morbid obesity    OA (osteoarthritis) of knee  bilateral replaced  Sleep apnea  CPAP Machine- stopped using CPAP as per Dr Sparrow - sleep study doctor, can't remeber the date  Thyroid nodule    Torn rotator cuff  bilateral

## 2020-01-23 NOTE — ASU PATIENT PROFILE, ADULT - PAIN LOCATION, PROFILE
back/cervical and lumbar spine/lumbar spine back/knee/cervical and lumbar spine// shoulder and knees/lumbar spine

## 2020-01-23 NOTE — ASU PATIENT PROFILE, ADULT - PSH
Achilles tendon contracture    H/O total knee replacement  left 7/9/2019,  History of colonoscopy  2017-normal  History of sleeve gastrectomy  2017- lost 120 lbs  History of total knee replacement, right  4/2019

## 2020-01-24 ENCOUNTER — APPOINTMENT (OUTPATIENT)
Dept: UROLOGY | Facility: HOSPITAL | Age: 65
End: 2020-01-24

## 2020-01-24 ENCOUNTER — RESULT REVIEW (OUTPATIENT)
Age: 65
End: 2020-01-24

## 2020-01-24 ENCOUNTER — INPATIENT (INPATIENT)
Facility: HOSPITAL | Age: 65
LOS: 1 days | Discharge: HOME | End: 2020-01-26
Attending: UROLOGY | Admitting: UROLOGY
Payer: COMMERCIAL

## 2020-01-24 VITALS
SYSTOLIC BLOOD PRESSURE: 154 MMHG | HEART RATE: 49 BPM | HEIGHT: 71.5 IN | WEIGHT: 199.96 LBS | OXYGEN SATURATION: 100 % | DIASTOLIC BLOOD PRESSURE: 82 MMHG | TEMPERATURE: 98 F | RESPIRATION RATE: 16 BRPM

## 2020-01-24 DIAGNOSIS — M67.00 SHORT ACHILLES TENDON (ACQUIRED), UNSPECIFIED ANKLE: Chronic | ICD-10-CM

## 2020-01-24 DIAGNOSIS — Z90.3 ACQUIRED ABSENCE OF STOMACH [PART OF]: Chronic | ICD-10-CM

## 2020-01-24 DIAGNOSIS — Z96.659 PRESENCE OF UNSPECIFIED ARTIFICIAL KNEE JOINT: Chronic | ICD-10-CM

## 2020-01-24 DIAGNOSIS — N40.1 BENIGN PROSTATIC HYPERPLASIA WITH LOWER URINARY TRACT SYMPTOMS: ICD-10-CM

## 2020-01-24 DIAGNOSIS — Z96.651 PRESENCE OF RIGHT ARTIFICIAL KNEE JOINT: Chronic | ICD-10-CM

## 2020-01-24 DIAGNOSIS — Z98.890 OTHER SPECIFIED POSTPROCEDURAL STATES: Chronic | ICD-10-CM

## 2020-01-24 LAB
ANION GAP SERPL CALC-SCNC: 13 MMO/L — SIGNIFICANT CHANGE UP (ref 7–14)
BUN SERPL-MCNC: 15 MG/DL — SIGNIFICANT CHANGE UP (ref 7–23)
CALCIUM SERPL-MCNC: 9.1 MG/DL — SIGNIFICANT CHANGE UP (ref 8.4–10.5)
CHLORIDE SERPL-SCNC: 105 MMOL/L — SIGNIFICANT CHANGE UP (ref 98–107)
CO2 SERPL-SCNC: 23 MMOL/L — SIGNIFICANT CHANGE UP (ref 22–31)
CREAT SERPL-MCNC: 1.1 MG/DL — SIGNIFICANT CHANGE UP (ref 0.5–1.3)
GLUCOSE BLDC GLUCOMTR-MCNC: 77 MG/DL — SIGNIFICANT CHANGE UP (ref 70–99)
GLUCOSE SERPL-MCNC: 93 MG/DL — SIGNIFICANT CHANGE UP (ref 70–99)
POTASSIUM SERPL-MCNC: 4.2 MMOL/L — SIGNIFICANT CHANGE UP (ref 3.5–5.3)
POTASSIUM SERPL-SCNC: 4.2 MMOL/L — SIGNIFICANT CHANGE UP (ref 3.5–5.3)
SODIUM SERPL-SCNC: 141 MMOL/L — SIGNIFICANT CHANGE UP (ref 135–145)

## 2020-01-24 PROCEDURE — 88305 TISSUE EXAM BY PATHOLOGIST: CPT | Mod: 26

## 2020-01-24 RX ORDER — AMLODIPINE BESYLATE 2.5 MG/1
10 TABLET ORAL DAILY
Refills: 0 | Status: DISCONTINUED | OUTPATIENT
Start: 2020-01-24 | End: 2020-01-26

## 2020-01-24 RX ORDER — OXYCODONE HYDROCHLORIDE 5 MG/1
10 TABLET ORAL EVERY 4 HOURS
Refills: 0 | Status: DISCONTINUED | OUTPATIENT
Start: 2020-01-24 | End: 2020-01-26

## 2020-01-24 RX ORDER — ACETAMINOPHEN 500 MG
975 TABLET ORAL EVERY 6 HOURS
Refills: 0 | Status: DISCONTINUED | OUTPATIENT
Start: 2020-01-24 | End: 2020-01-26

## 2020-01-24 RX ORDER — SODIUM CHLORIDE 9 MG/ML
1000 INJECTION, SOLUTION INTRAVENOUS
Refills: 0 | Status: DISCONTINUED | OUTPATIENT
Start: 2020-01-24 | End: 2020-01-24

## 2020-01-24 RX ORDER — HEPARIN SODIUM 5000 [USP'U]/ML
5000 INJECTION INTRAVENOUS; SUBCUTANEOUS EVERY 8 HOURS
Refills: 0 | Status: DISCONTINUED | OUTPATIENT
Start: 2020-01-24 | End: 2020-01-26

## 2020-01-24 RX ORDER — OXYCODONE HYDROCHLORIDE 5 MG/1
5 TABLET ORAL EVERY 4 HOURS
Refills: 0 | Status: DISCONTINUED | OUTPATIENT
Start: 2020-01-24 | End: 2020-01-26

## 2020-01-24 RX ORDER — HYDROMORPHONE HYDROCHLORIDE 2 MG/ML
1 INJECTION INTRAMUSCULAR; INTRAVENOUS; SUBCUTANEOUS
Refills: 0 | Status: DISCONTINUED | OUTPATIENT
Start: 2020-01-24 | End: 2020-01-24

## 2020-01-24 RX ORDER — HYDROMORPHONE HYDROCHLORIDE 2 MG/ML
0.5 INJECTION INTRAMUSCULAR; INTRAVENOUS; SUBCUTANEOUS
Refills: 0 | Status: DISCONTINUED | OUTPATIENT
Start: 2020-01-24 | End: 2020-01-24

## 2020-01-24 RX ORDER — AMPICILLIN TRIHYDRATE 250 MG
2 CAPSULE ORAL EVERY 6 HOURS
Refills: 0 | Status: DISCONTINUED | OUTPATIENT
Start: 2020-01-24 | End: 2020-01-25

## 2020-01-24 RX ORDER — LIDOCAINE 4 G/100G
1 CREAM TOPICAL
Refills: 0 | Status: DISCONTINUED | OUTPATIENT
Start: 2020-01-24 | End: 2020-01-26

## 2020-01-24 RX ORDER — SODIUM CHLORIDE 9 MG/ML
1000 INJECTION, SOLUTION INTRAVENOUS
Refills: 0 | Status: DISCONTINUED | OUTPATIENT
Start: 2020-01-24 | End: 2020-01-25

## 2020-01-24 RX ORDER — INFLUENZA VIRUS VACCINE 15; 15; 15; 15 UG/.5ML; UG/.5ML; UG/.5ML; UG/.5ML
0.5 SUSPENSION INTRAMUSCULAR ONCE
Refills: 0 | Status: COMPLETED | OUTPATIENT
Start: 2020-01-24 | End: 2020-01-25

## 2020-01-24 RX ORDER — SENNA PLUS 8.6 MG/1
2 TABLET ORAL AT BEDTIME
Refills: 0 | Status: DISCONTINUED | OUTPATIENT
Start: 2020-01-24 | End: 2020-01-26

## 2020-01-24 RX ORDER — ONDANSETRON 8 MG/1
4 TABLET, FILM COATED ORAL ONCE
Refills: 0 | Status: DISCONTINUED | OUTPATIENT
Start: 2020-01-24 | End: 2020-01-24

## 2020-01-24 RX ORDER — PANTOPRAZOLE SODIUM 20 MG/1
1 TABLET, DELAYED RELEASE ORAL
Qty: 0 | Refills: 0 | DISCHARGE

## 2020-01-24 RX ORDER — CEFAZOLIN SODIUM 1 G
2000 VIAL (EA) INJECTION EVERY 8 HOURS
Refills: 0 | Status: DISCONTINUED | OUTPATIENT
Start: 2020-01-24 | End: 2020-01-24

## 2020-01-24 RX ORDER — CELECOXIB 200 MG/1
1 CAPSULE ORAL
Qty: 0 | Refills: 0 | DISCHARGE

## 2020-01-24 RX ORDER — PANTOPRAZOLE SODIUM 20 MG/1
40 TABLET, DELAYED RELEASE ORAL
Refills: 0 | Status: DISCONTINUED | OUTPATIENT
Start: 2020-01-24 | End: 2020-01-26

## 2020-01-24 RX ADMIN — OXYCODONE HYDROCHLORIDE 10 MILLIGRAM(S): 5 TABLET ORAL at 18:16

## 2020-01-24 RX ADMIN — Medication 216 GRAM(S): at 18:06

## 2020-01-24 RX ADMIN — HEPARIN SODIUM 5000 UNIT(S): 5000 INJECTION INTRAVENOUS; SUBCUTANEOUS at 15:10

## 2020-01-24 RX ADMIN — HEPARIN SODIUM 5000 UNIT(S): 5000 INJECTION INTRAVENOUS; SUBCUTANEOUS at 22:05

## 2020-01-24 RX ADMIN — OXYCODONE HYDROCHLORIDE 10 MILLIGRAM(S): 5 TABLET ORAL at 19:10

## 2020-01-24 RX ADMIN — SODIUM CHLORIDE 125 MILLILITER(S): 9 INJECTION, SOLUTION INTRAVENOUS at 10:00

## 2020-01-24 RX ADMIN — Medication 216 GRAM(S): at 11:15

## 2020-01-24 RX ADMIN — SENNA PLUS 2 TABLET(S): 8.6 TABLET ORAL at 22:05

## 2020-01-24 NOTE — PROGRESS NOTE ADULT - SUBJECTIVE AND OBJECTIVE BOX
Note    Post op Check    s/p: TURP    Pt seen / examined without complaints pain controlled    Vital Signs Last 24 Hrs  T(C): 36.4 (24 Jan 2020 14:49), Max: 36.7 (24 Jan 2020 05:59)  T(F): 97.6 (24 Jan 2020 14:49), Max: 98.1 (24 Jan 2020 05:59)  HR: 59 (24 Jan 2020 14:49) (45 - 59)  BP: 129/77 (24 Jan 2020 14:49) (129/77 - 172/85)  BP(mean): 95 (24 Jan 2020 11:15) (95 - 107)  RR: 17 (24 Jan 2020 14:49) (9 - 17)  SpO2: 100% (24 Jan 2020 14:49) (97% - 100%)    I&O's Summary    23 Jan 2020 07:01  -  24 Jan 2020 07:00  --------------------------------------------------------  IN: 30 mL / OUT: 0 mL / NET: 30 mL    24 Jan 2020 07:01  -  24 Jan 2020 14:57  --------------------------------------------------------  IN: 0 mL / OUT: 4000 mL / NET: -4000 mL    CBI light pink    PHYSICAL EXAM:       Constitutional: awake alert NAD    Respiratory: no resp distress    Cardiovascular: RR    Gastrointestinal: soft NT ND, no CVAT    Genitourinary: uday in place CBI in progress, light pink    Extremities: + venodynes          01-24    141  |  105  |  15  ----------------------------<  93  4.2   |  23  |  1.10    Ca    9.1      24 Jan 2020 09:44

## 2020-01-24 NOTE — PROGRESS NOTE ADULT - PROBLEM SELECTOR PLAN 1
Strict I&O's  /  CBI  Analgesia Antiemetics  DVT prophylaxis  Incentive spirometry  Clears to Reg as fredy / OOB  AM labs  D/C CBI and TOV in AM

## 2020-01-24 NOTE — BRIEF OPERATIVE NOTE - NSICDXBRIEFPREOP_GEN_ALL_CORE_FT
PRE-OP DIAGNOSIS:  Enlarged prostate with lower urinary tract symptoms (LUTS) 24-Jan-2020 08:53:16  Zheng Paul

## 2020-01-24 NOTE — BRIEF OPERATIVE NOTE - NSICDXBRIEFPOSTOP_GEN_ALL_CORE_FT
POST-OP DIAGNOSIS:  Enlarged prostate with lower urinary tract symptoms (LUTS) 24-Jan-2020 08:53:13  Zheng Paul

## 2020-01-25 ENCOUNTER — TRANSCRIPTION ENCOUNTER (OUTPATIENT)
Age: 65
End: 2020-01-25

## 2020-01-25 LAB — SPECIMEN SOURCE: SIGNIFICANT CHANGE UP

## 2020-01-25 RX ORDER — SENNA PLUS 8.6 MG/1
2 TABLET ORAL
Qty: 0 | Refills: 0 | DISCHARGE

## 2020-01-25 RX ORDER — CEPHALEXIN 500 MG
1 CAPSULE ORAL
Qty: 9 | Refills: 0
Start: 2020-01-25 | End: 2020-01-27

## 2020-01-25 RX ORDER — ACETAMINOPHEN 500 MG
2 TABLET ORAL
Qty: 0 | Refills: 0 | DISCHARGE

## 2020-01-25 RX ADMIN — AMLODIPINE BESYLATE 10 MILLIGRAM(S): 2.5 TABLET ORAL at 05:58

## 2020-01-25 RX ADMIN — Medication 216 GRAM(S): at 13:03

## 2020-01-25 RX ADMIN — SENNA PLUS 2 TABLET(S): 8.6 TABLET ORAL at 22:46

## 2020-01-25 RX ADMIN — OXYCODONE HYDROCHLORIDE 10 MILLIGRAM(S): 5 TABLET ORAL at 20:35

## 2020-01-25 RX ADMIN — Medication 216 GRAM(S): at 05:58

## 2020-01-25 RX ADMIN — HEPARIN SODIUM 5000 UNIT(S): 5000 INJECTION INTRAVENOUS; SUBCUTANEOUS at 22:46

## 2020-01-25 RX ADMIN — INFLUENZA VIRUS VACCINE 0.5 MILLILITER(S): 15; 15; 15; 15 SUSPENSION INTRAMUSCULAR at 16:50

## 2020-01-25 RX ADMIN — LIDOCAINE 1 APPLICATION(S): 4 CREAM TOPICAL at 00:14

## 2020-01-25 RX ADMIN — OXYCODONE HYDROCHLORIDE 10 MILLIGRAM(S): 5 TABLET ORAL at 04:39

## 2020-01-25 RX ADMIN — PANTOPRAZOLE SODIUM 40 MILLIGRAM(S): 20 TABLET, DELAYED RELEASE ORAL at 05:58

## 2020-01-25 RX ADMIN — OXYCODONE HYDROCHLORIDE 10 MILLIGRAM(S): 5 TABLET ORAL at 19:35

## 2020-01-25 RX ADMIN — Medication 216 GRAM(S): at 00:14

## 2020-01-25 RX ADMIN — HEPARIN SODIUM 5000 UNIT(S): 5000 INJECTION INTRAVENOUS; SUBCUTANEOUS at 05:58

## 2020-01-25 RX ADMIN — OXYCODONE HYDROCHLORIDE 10 MILLIGRAM(S): 5 TABLET ORAL at 03:39

## 2020-01-25 RX ADMIN — HEPARIN SODIUM 5000 UNIT(S): 5000 INJECTION INTRAVENOUS; SUBCUTANEOUS at 13:03

## 2020-01-25 NOTE — DISCHARGE NOTE PROVIDER - NSDCCPCAREPLAN_GEN_ALL_CORE_FT
PRINCIPAL DISCHARGE DIAGNOSIS  Diagnosis: BPH (benign prostatic hyperplasia)  Assessment and Plan of Treatment: PRINCIPAL DISCHARGE DIAGNOSIS  Diagnosis: BPH (benign prostatic hyperplasia)  Assessment and Plan of Treatment: Drink plenty of fluids.  No heavy lifting or straining for 4 to 6 weeks, avoid constipation. You may have intermittent pink tinged urine and urinary dribbling.  This is normal.   If your urine becomes bright red or with clots, please call the office.  Call Dr. Ramirez's office to schedule a follow up appointment. PRINCIPAL DISCHARGE DIAGNOSIS  Diagnosis: BPH (benign prostatic hyperplasia)  Assessment and Plan of Treatment: Drink plenty of fluids.  No heavy lifting or straining for 4 to 6 weeks, avoid constipation. You may have intermittent pink tinged urine and urinary dribbling.  This is normal.   If your urine becomes bright red or with clots, please call the office.  You may alternate taking Ibuprofen and Tylenol, as directed, if needed for pain control.    Call Dr. Ramirez's office to schedule a follow up appointment.

## 2020-01-25 NOTE — PROGRESS NOTE ADULT - ASSESSMENT
65 yo M s/p TURP 1/24/20    -frye d/c, check PVR, monitor color  -continue ampicillin  -DVT prophy, IS, OOB, ambulate  -d/c home later today

## 2020-01-25 NOTE — DISCHARGE NOTE NURSING/CASE MANAGEMENT/SOCIAL WORK - PATIENT PORTAL LINK FT
You can access the FollowMyHealth Patient Portal offered by [facility] by registering at the following website: [email or web address]. By joining [employer]’s FollowMyHealth portal, you will also be able to view your health information using other applications (apps) compatible with our system.

## 2020-01-25 NOTE — DISCHARGE NOTE PROVIDER - HOSPITAL COURSE
Patient admitted to the urology service. TURP was performed.         The patient tolerated the procedure well. There were no complications. The patient was extubated in the OR and transferred to the PACU in stable condition and transferred to the urology floor with continuous bladder irrigation. The irrigation was clamped POD #1 and urine remained clear. Pt passed his TOV, PVR ___________.        At the time of discharge, the patient was hemodynamically stable, and was comfortable with adequate pain control. The patient was instructed to follow up with Dr. Ramirez within 1-2 weeks after discharge from the hospital. The patient felt comfortable with discharge. The patient was discharged to home. The patient had no other issues. Patient admitted to the urology service. TURP was performed.         The patient tolerated the procedure well. There were no complications. The patient was extubated in the OR and transferred to the PACU in stable condition and transferred to the urology floor with continuous bladder irrigation. The irrigation was clamped POD #1 and urine remained clear. Pt passed his TOV, PVR and color acceptable.        At the time of discharge, the patient was hemodynamically stable, and was comfortable with adequate pain control. The patient was instructed to follow up with Dr. Ramirez within 1-2 weeks after discharge from the hospital. The patient felt comfortable with discharge. The patient was discharged to home. The patient had no other issues. Patient admitted to the urology service. TURP was performed.         The patient tolerated the procedure well. There were no complications. The patient was extubated in the OR and transferred to the PACU in stable condition and transferred to the urology floor with continuous bladder irrigation. The irrigation was clamped POD #1 and urine remained clear. Pt passed his TOV, PVR and color acceptable.  Prior to being discharged, the patient passed a clot and wanted to be observed overnight.  On POD#2, the patient continued to void spontaneously without issue and color improved, PVR 27cc.          At the time of discharge, the patient was hemodynamically stable, and was comfortable with adequate pain control. The patient was instructed to follow up with Dr. Ramirez within 1-2 weeks after discharge from the hospital. The patient felt comfortable with discharge. The patient was discharged to home. The patient had no other issues.

## 2020-01-25 NOTE — DIETITIAN INITIAL EVALUATION ADULT. - PERTINENT MEDS FT
MEDICATIONS  (STANDING):  amLODIPine   Tablet 10 milliGRAM(s) Oral daily  ampicillin  IVPB 2 Gram(s) IV Intermittent every 6 hours  heparin  Injectable 5000 Unit(s) SubCutaneous every 8 hours  influenza   Vaccine 0.5 milliLiter(s) IntraMuscular once  pantoprazole    Tablet 40 milliGRAM(s) Oral before breakfast  senna 2 Tablet(s) Oral at bedtime

## 2020-01-25 NOTE — DIETITIAN INITIAL EVALUATION ADULT. - PERTINENT LABORATORY DATA
01-24 Na 141 mmol/L Glu 93 mg/dL K+ 4.2 mmol/L Cr 1.10 mg/dL BUN 15 mg/dL Phos n/a    01-22-20 HbA1c 5.7 %

## 2020-01-25 NOTE — DISCHARGE NOTE PROVIDER - NSDCMRMEDTOKEN_GEN_ALL_CORE_FT
amLODIPine 10 mg oral tablet: 1 tab(s) orally once a day  CeleBREX 200 mg oral capsule: 1 cap(s) orally 2 times a day. Last dose 1/21/20.  Keflex 500 mg oral capsule: 1 cap(s) orally 3 times a day   pantoprazole 40 mg oral delayed release tablet: 1 tab(s) orally once a day acetaminophen 325 mg oral tablet: 3 tablets every 6 hours as needed for pain  amLODIPine 10 mg oral tablet: 1 tab(s) orally once a day  CeleBREX 200 mg oral capsule: 1 cap(s) orally 2 times a day. Last dose 1/21/20.  pantoprazole 40 mg oral delayed release tablet: 1 tab(s) orally once a day  Senna 8.6 mg oral tablet: 2 tab(s) orally once a day (at bedtime)

## 2020-01-25 NOTE — DIETITIAN INITIAL EVALUATION ADULT. - ADD RECOMMEND
1). Continue nutrition care plan as ordered. 2). Monitor weights, labs, BM's, skin integrity, p.o. intake. 3). Follow pt as per protocol.

## 2020-01-25 NOTE — PROGRESS NOTE ADULT - SUBJECTIVE AND OBJECTIVE BOX
Overnight events:  None, CBI remained clear and was clamped at 5am    Subjective:  Pt offers no complaints    Objective:    Vital signs  T(C): , Max: 36.9 (01-24-20 @ 17:36)  HR: 59 (01-25-20 @ 05:56)  BP: 126/79 (01-25-20 @ 05:56)  SpO2: 100% (01-25-20 @ 05:56)  Wt(kg): --    Output   CBI pink tinged  01-24 @ 07:01  -  01-25 @ 07:00  --------------------------------------------------------  IN: 31625 mL / OUT: 73970 mL / NET: -3100 mL        Gen: NAD  Abd: soft, nontender, no suprapubic tenderness or fullness  : uday removed on rounds    Labs: none today

## 2020-01-25 NOTE — DIETITIAN INITIAL EVALUATION ADULT. - OTHER INFO
63 y/o male admitted with dx of BPH s/p TURP on 1/24. Visited pt for nutrition hx. Pt endorsed having gastric sleeve gastrectomy in 2017. His weight prior to procedure was 300 lbs. He subsequently lost 116 lbs over the next year, with weight stablized at 185 - 190 lbs since his achievement at losing this extra weight. There is a weight of 188 KG recorded in EM which is inaccurate. Suspect error is that 188 in LBS rather than KG. Pt says that current weight is 185 lbs (84 kg). Pt has been eating well during this admission. He denies food allergies, nausea/vomiting/diarrhea/constipation, or issues with chewing/swallowing. He follows a general healthy eating style as recommended by bariatric RDN at his doctor's office. He was able to teach back recall of meals he eats which comply with guidelines of healthy eating based on My Plate recommendations. Pt does not have any nutrition related questions or concerns at this time.

## 2020-01-25 NOTE — DISCHARGE NOTE PROVIDER - CARE PROVIDER_API CALL
Dallas Ramirez)  Urology  [address] [place] [address], [place] [address]  Phone: [phone]  Fax: [phone]  Follow Up Time:

## 2020-01-25 NOTE — DISCHARGE NOTE NURSING/CASE MANAGEMENT/SOCIAL WORK - NSDCPNINST_GEN_ALL_CORE
Call MD with any signs of infection ie. fever/shaking chills, cloudy foul-smelling urine, or with signs of bleeding (urine red like ketchup), or persistent nausea, or with increased unrelieved flank pain. Continue to drink plenty of fluids and avoid straining as well as constipation which may be a side effect from taking narcotic pain meds. Follow-up with MD in office for post-op check as well as with PMD as advised for continuity of care.

## 2020-01-26 VITALS
OXYGEN SATURATION: 100 % | RESPIRATION RATE: 18 BRPM | DIASTOLIC BLOOD PRESSURE: 70 MMHG | TEMPERATURE: 98 F | SYSTOLIC BLOOD PRESSURE: 141 MMHG | HEART RATE: 65 BPM

## 2020-01-26 LAB — BACTERIA UR CULT: SIGNIFICANT CHANGE UP

## 2020-01-26 RX ADMIN — HEPARIN SODIUM 5000 UNIT(S): 5000 INJECTION INTRAVENOUS; SUBCUTANEOUS at 06:29

## 2020-01-26 RX ADMIN — AMLODIPINE BESYLATE 10 MILLIGRAM(S): 2.5 TABLET ORAL at 06:29

## 2020-01-26 RX ADMIN — PANTOPRAZOLE SODIUM 40 MILLIGRAM(S): 20 TABLET, DELAYED RELEASE ORAL at 06:29

## 2020-01-26 NOTE — PROGRESS NOTE ADULT - ATTENDING COMMENTS
Pt seen and examined  feeling well  urine remains tinged, last passage of a single clot yesterday  will check PVR this am

## 2020-01-26 NOTE — PROGRESS NOTE ADULT - ASSESSMENT
65 yo M s/p TURP 1/24/20    -frye d/c, check PVR, monitor color  -DVT prophy, IS, OOB, ambulate  -d/c home later today

## 2020-01-26 NOTE — PROGRESS NOTE ADULT - SUBJECTIVE AND OBJECTIVE BOX
Interval Events:    Passed clot overnight, otherwise urinating well    S: Patient doing well, denies fevers, chills, nausea, emesis, chest pain, SOB.  Pain is well controlled. Tolerating PO w/o N/V. Says urine is still reddish.    O: Vital Signs Last 24 Hrs  T(C): 37.3 (26 Jan 2020 06:26), Max: 37.5 (25 Jan 2020 17:31)  T(F): 99.1 (26 Jan 2020 06:26), Max: 99.5 (25 Jan 2020 17:31)  HR: 64 (26 Jan 2020 06:26) (57 - 68)  BP: 138/82 (26 Jan 2020 06:26) (124/76 - 138/82)  BP(mean): --  RR: 17 (26 Jan 2020 06:26) (17 - 18)  SpO2: 100% (26 Jan 2020 06:26) (99% - 100%)      25 Jan 2020 07:01  -  26 Jan 2020 07:00  --------------------------------------------------------  IN:  Total IN: 0 mL    OUT:    Voided: 1700 mL  Total OUT: 1700 mL    Total NET: -1700 mL          Physical Exam:    Gen: Well-developed, well-nourished in no acute distress  Resp: No additional work of breathing   GI: Soft, non-tender, non-distended, with normoactive bowel sounds.  No masses.  MSK: Moves all extremities equally  Skin: No rashes    Labs:    24 Jan 2020 09:44    141    |  105    |  15     ----------------------------<  93     4.2     |  23     |  1.10     Ca    9.1        24 Jan 2020 09:44        CAPILLARY BLOOD GLUCOSE                Culture - Urine (collected 24 Jan 2020 09:17)  Source: URINE BLADDER  Preliminary Report (25 Jan 2020 08:40):    CULTURE IN PROGRESS, FURTHER REPORT TO FOLLOW.          MEDICATIONS  (STANDING):  amLODIPine   Tablet 10 milliGRAM(s) Oral daily  heparin  Injectable 5000 Unit(s) SubCutaneous every 8 hours  pantoprazole    Tablet 40 milliGRAM(s) Oral before breakfast  senna 2 Tablet(s) Oral at bedtime    MEDICATIONS  (PRN):  acetaminophen   Tablet .. 975 milliGRAM(s) Oral every 6 hours PRN Temp greater or equal to 38C (100.4F), Mild Pain (1 - 3)  lidocaine 2% Gel 1 Application(s) Topical four times a day PRN catheter irritation  oxyCODONE    IR 5 milliGRAM(s) Oral every 4 hours PRN Moderate Pain (4 - 6)  oxyCODONE    IR 10 milliGRAM(s) Oral every 4 hours PRN Severe Pain (7 - 10)

## 2020-02-12 ENCOUNTER — APPOINTMENT (OUTPATIENT)
Dept: UROLOGY | Facility: CLINIC | Age: 65
End: 2020-02-12
Payer: COMMERCIAL

## 2020-02-12 DIAGNOSIS — N40.1 BENIGN PROSTATIC HYPERPLASIA WITH LOWER URINARY TRACT SYMPMS: ICD-10-CM

## 2020-02-12 DIAGNOSIS — N13.8 BENIGN PROSTATIC HYPERPLASIA WITH LOWER URINARY TRACT SYMPMS: ICD-10-CM

## 2020-02-12 PROBLEM — G47.30 SLEEP APNEA, UNSPECIFIED: Chronic | Status: ACTIVE | Noted: 2017-07-12

## 2020-02-12 PROBLEM — E11.9 TYPE 2 DIABETES MELLITUS WITHOUT COMPLICATIONS: Chronic | Status: ACTIVE | Noted: 2018-08-30

## 2020-02-12 PROCEDURE — 99024 POSTOP FOLLOW-UP VISIT: CPT

## 2020-02-12 NOTE — HISTORY OF PRESENT ILLNESS
[FreeTextEntry1] : s/p TURP 1/24/2020\par Path: BPH, 34 grams.\par \par Came to office today with gross hematuria, occasional clots.\par Nocturia 2x per night.\par No dysuria, incontinence, fever/chills, abdominal pain.

## 2020-02-12 NOTE — ASSESSMENT
[FreeTextEntry1] : Bladder irrigated with 18 Fr 6 eye catheter.  Minimal clots.\par Urine pink.\par Encouraged to increase PO fluid intake.\par Follow up in 2 weeks.

## 2020-02-12 NOTE — PHYSICAL EXAM
[General Appearance - Well Nourished] : well nourished [General Appearance - Well Developed] : well developed [Normal Appearance] : normal appearance [General Appearance - In No Acute Distress] : no acute distress [Well Groomed] : well groomed [Abdomen Tenderness] : non-tender [Costovertebral Angle Tenderness] : no ~M costovertebral angle tenderness [Abdomen Soft] : soft [Urinary Bladder Findings] : the bladder was normal on palpation

## 2020-02-13 LAB — BACTERIA UR CULT: NORMAL

## 2020-02-26 ENCOUNTER — APPOINTMENT (OUTPATIENT)
Dept: GASTROENTEROLOGY | Facility: AMBULATORY MEDICAL SERVICES | Age: 65
End: 2020-02-26
Payer: COMMERCIAL

## 2020-02-26 PROCEDURE — 43239 EGD BIOPSY SINGLE/MULTIPLE: CPT

## 2020-02-26 PROCEDURE — 45378 DIAGNOSTIC COLONOSCOPY: CPT | Mod: 33

## 2020-03-03 ENCOUNTER — APPOINTMENT (OUTPATIENT)
Dept: UROLOGY | Facility: CLINIC | Age: 65
End: 2020-03-03

## 2020-03-17 ENCOUNTER — APPOINTMENT (OUTPATIENT)
Dept: GASTROENTEROLOGY | Facility: CLINIC | Age: 65
End: 2020-03-17

## 2020-03-31 ENCOUNTER — APPOINTMENT (OUTPATIENT)
Dept: UROLOGY | Facility: CLINIC | Age: 65
End: 2020-03-31

## 2020-04-22 ENCOUNTER — APPOINTMENT (OUTPATIENT)
Dept: HEMATOLOGY ONCOLOGY | Facility: CLINIC | Age: 65
End: 2020-04-22
Payer: COMMERCIAL

## 2020-04-22 ENCOUNTER — OUTPATIENT (OUTPATIENT)
Dept: OUTPATIENT SERVICES | Facility: HOSPITAL | Age: 65
LOS: 1 days | Discharge: HOME | End: 2020-04-22

## 2020-04-22 DIAGNOSIS — C25.9 MALIGNANT NEOPLASM OF PANCREAS, UNSPECIFIED: ICD-10-CM

## 2020-04-22 DIAGNOSIS — Z98.890 OTHER SPECIFIED POSTPROCEDURAL STATES: Chronic | ICD-10-CM

## 2020-04-22 DIAGNOSIS — G89.3 NEOPLASM RELATED PAIN (ACUTE) (CHRONIC): ICD-10-CM

## 2020-04-22 DIAGNOSIS — M67.00 SHORT ACHILLES TENDON (ACQUIRED), UNSPECIFIED ANKLE: Chronic | ICD-10-CM

## 2020-04-22 DIAGNOSIS — Z96.659 PRESENCE OF UNSPECIFIED ARTIFICIAL KNEE JOINT: Chronic | ICD-10-CM

## 2020-04-22 DIAGNOSIS — R11.2 NAUSEA WITH VOMITING, UNSPECIFIED: ICD-10-CM

## 2020-04-22 DIAGNOSIS — Z90.3 ACQUIRED ABSENCE OF STOMACH [PART OF]: Chronic | ICD-10-CM

## 2020-04-22 DIAGNOSIS — Z96.651 PRESENCE OF RIGHT ARTIFICIAL KNEE JOINT: Chronic | ICD-10-CM

## 2020-04-22 PROCEDURE — 99205 OFFICE O/P NEW HI 60 MIN: CPT | Mod: 95

## 2020-04-22 RX ORDER — AMLODIPINE BESYLATE 10 MG/1
10 TABLET ORAL
Qty: 90 | Refills: 0 | Status: DISCONTINUED | COMMUNITY
Start: 2019-04-10 | End: 2020-04-22

## 2020-04-22 RX ORDER — METOCLOPRAMIDE 10 MG/1
10 TABLET ORAL
Qty: 90 | Refills: 0 | Status: ACTIVE | COMMUNITY
Start: 2020-04-22 | End: 1900-01-01

## 2020-04-22 RX ORDER — HYDRALAZINE HYDROCHLORIDE 25 MG/1
25 TABLET ORAL
Qty: 30 | Refills: 0 | Status: DISCONTINUED | COMMUNITY
Start: 2019-09-15 | End: 2020-04-22

## 2020-04-22 RX ORDER — AMLODIPINE BESYLATE AND BENAZEPRIL HYDROCHLORIDE 10; 40 MG/1; MG/1
CAPSULE ORAL
Refills: 0 | Status: DISCONTINUED | COMMUNITY
End: 2020-04-22

## 2020-04-22 RX ORDER — PANTOPRAZOLE 20 MG/1
20 TABLET, DELAYED RELEASE ORAL DAILY
Qty: 30 | Refills: 0 | Status: ACTIVE | COMMUNITY
Start: 2020-04-22 | End: 1900-01-01

## 2020-04-22 RX ORDER — SODIUM SULFATE, POTASSIUM SULFATE, MAGNESIUM SULFATE 17.5; 3.13; 1.6 G/ML; G/ML; G/ML
17.5-3.13-1.6 SOLUTION, CONCENTRATE ORAL
Qty: 1 | Refills: 0 | Status: DISCONTINUED | COMMUNITY
Start: 2019-11-12 | End: 2020-04-22

## 2020-04-22 RX ORDER — SUCRALFATE 1 G/10ML
1 SUSPENSION ORAL 3 TIMES DAILY
Qty: 1 | Refills: 3 | Status: ACTIVE | COMMUNITY
Start: 2020-04-22 | End: 1900-01-01

## 2020-04-22 RX ORDER — LABETALOL HYDROCHLORIDE 200 MG/1
200 TABLET, FILM COATED ORAL
Qty: 30 | Refills: 0 | Status: DISCONTINUED | COMMUNITY
Start: 2019-09-15 | End: 2020-04-22

## 2020-04-22 RX ORDER — PANTOPRAZOLE 40 MG/1
40 TABLET, DELAYED RELEASE ORAL
Qty: 30 | Refills: 0 | Status: DISCONTINUED | COMMUNITY
Start: 2019-08-08 | End: 2020-04-22

## 2020-04-22 RX ORDER — TAMSULOSIN HYDROCHLORIDE 0.4 MG/1
0.4 CAPSULE ORAL
Qty: 90 | Refills: 3 | Status: DISCONTINUED | COMMUNITY
Start: 2019-10-29 | End: 2020-04-22

## 2020-04-22 RX ORDER — PANTOPRAZOLE 20 MG/1
20 TABLET, DELAYED RELEASE ORAL
Refills: 0 | Status: DISCONTINUED | COMMUNITY
End: 2020-04-22

## 2020-04-24 ENCOUNTER — APPOINTMENT (OUTPATIENT)
Dept: OTHER | Facility: CLINIC | Age: 65
End: 2020-04-24
Payer: COMMERCIAL

## 2020-04-24 DIAGNOSIS — Z03.89 ENCOUNTER FOR OBSERVATION FOR OTHER SUSPECTED DISEASES AND CONDITIONS RULED OUT: ICD-10-CM

## 2020-04-24 PROCEDURE — 99443: CPT

## 2020-04-24 RX ORDER — OXYCODONE 5 MG/1
5 TABLET ORAL
Qty: 120 | Refills: 0 | Status: ACTIVE | COMMUNITY
Start: 2020-04-22 | End: 1900-01-01

## 2020-05-10 PROBLEM — G89.3 PAIN OF METASTATIC MALIGNANCY: Status: ACTIVE | Noted: 2020-05-10

## 2020-05-10 PROBLEM — R11.2 NAUSEA & VOMITING: Status: ACTIVE | Noted: 2020-05-10

## 2020-05-10 PROBLEM — C25.9 PANCREATIC CANCER: Status: ACTIVE | Noted: 2020-04-22

## 2020-05-10 NOTE — REASON FOR VISIT
[Initial Consultation] : an initial consultation [Family Member] : family member [FreeTextEntry2] : Pancreatic mass, liver lesions

## 2020-05-10 NOTE — PHYSICAL EXAM
[Ambulatory and capable of all self care but unable to carry out any work activities] : Status 2- Ambulatory and capable of all self care but unable to carry out any work activities. Up and about more than 50% of waking hours [Normal] : affect appropriate [de-identified] : chronically ill appearing, appears in distress 2/2 pain

## 2020-05-10 NOTE — HISTORY OF PRESENT ILLNESS
[Other Location: e.g. Home (Enter Location, City,State)___] : at [unfilled] [Home] : at home, [unfilled] , at the time of the visit. [Patient] : the patient [Self] : self [AJCC Stage: ____] : AJCC Stage: [unfilled] [Disease: _____________________] : Disease: [unfilled] [de-identified] : 64 M h/o BPH s/p TURP, presents for evaluation of a pancreatic mass and liver lesions. \par \par Soto developed abdominal pain several months ago and was treated empirically for diverticulitis with Ab. This did not help and pain persisted. Underwent EGD/[place] at the end of Feb 2020 which were negative for malignancy.  In the interim, he also developed hematuria and was evaluated by urology. During this time he lost 35 lbs due to poor appetite and fear of eating as this would exacerbate the pain. Had been taking OTC tylenol and advil which did not help. Reports that his bariatric surgeon ordered CT A/P which revealed pancreatic mass, liver lesions suspicious for liver mets. No biopsy has been done yet. Referred to medical oncology. Pt reports he also has a med onc visit arranged with [facility] immediately after this visit today. \par \par \par  \par  [FreeTextEntry2] : Soto Sears [de-identified] : n/a [de-identified] : ROS: \par + daily N/v. Tried Prilosec without help. \par + severe pain, constant, lower pelvic area, mostly on the Left. \par + fatigue. Can barely walk. needs assistance with ADLs. \par Lives with daughter. Brother came to help. \par + Hematuria ongoing. \par Moving bowels. No melena or hematochezia. \par + dry mouth\par + dizziness\par + chest pain, L side, sharp, worse when moves around, not reproducible. laying down makes it worse. constant. No SOB. \par + back pain, upper back, \par \par

## 2020-05-10 NOTE — REVIEW OF SYSTEMS
[Negative] : Allergic/Immunologic [Fatigue] : fatigue [Recent Change In Weight] : ~T recent weight change [Chest Pain] : chest pain [SOB on Exertion] : shortness of breath during exertion [Abdominal Pain] : abdominal pain [Vomiting] : vomiting [Difficulty Walking] : difficulty walking [Dizziness] : dizziness [Muscle Weakness] : muscle weakness [Anxiety] : anxiety [FreeTextEntry8] : + hematuria

## 2020-05-12 ENCOUNTER — FORM ENCOUNTER (OUTPATIENT)
Age: 65
End: 2020-05-12

## 2020-06-10 ENCOUNTER — FORM ENCOUNTER (OUTPATIENT)
Age: 65
End: 2020-06-10

## 2020-12-23 PROBLEM — Z12.11 ENCOUNTER FOR COLORECTAL CANCER SCREENING: Status: RESOLVED | Noted: 2019-11-12 | Resolved: 2020-12-23

## 2021-03-23 NOTE — ASU PREOP CHECKLIST - NS PREOP CHK CHLOROHEX WASH
Medication list as of 3/9/21: Klonopin 0.5 mg daily and 1 mg HS, Invega 3 mg HS, Cogentin 0.5 mg BID, Depakote 750 mg BID, Zyprexa 20 mg HS, and Invega Trinza 819 mg q3 months (was due on 3/15/21; unclear if received this) at home:

## 2021-07-30 NOTE — H&P PST ADULT - MUSCULOSKELETAL
Addended by: KAREN GARLAND on: 7/30/2021 01:56 PM     Modules accepted: Level of Service     details… detailed exam decreased ROM/joint swelling/joint warmth

## 2021-09-02 NOTE — H&P PST ADULT - CONSTITUTIONAL

## 2021-10-27 NOTE — DISCHARGE NOTE ADULT - MEDICATION SUMMARY - MEDICATIONS TO TAKE
I just called [facility] and they have the prescription I sent earlier today there for her.     Lakshmi Mishra MD    I will START or STAY ON the medications listed below when I get home from the hospital:    Lipofen 150 mg oral capsule  -- 1 cap(s) by mouth once a day  -- Indication: For Hyperlipidemia    Bystolic 20 mg oral tablet  -- 1 tab(s) by mouth once a day  -- Indication: For Hypertension    amLODIPine 10 mg oral tablet  -- 1 tab(s) by mouth once a day  -- Indication: For Hypertension    hydrALAZINE 10 mg oral tablet  --  by mouth   -- Indication: For Hypertension

## 2022-03-02 NOTE — PATIENT PROFILE ADULT - AD AGENT PHONE NUMBER
[phone] I will STOP taking the medications listed below when I get home from the hospital:  None

## 2022-03-15 NOTE — PATIENT PROFILE ADULT. - NUTRITION PROFILE
[de-identified] : Thyroid (3/7/22 [facility]) 3.2 and 2.2 cm L thyroid nodules.
bariatric surgery

## 2022-12-19 NOTE — H&P PST ADULT - BREASTS
What Type Of Note Output Would You Prefer (Optional)?: Standard Output Hpi Title: Evaluation of a Skin Lesion Have Your Spot(S) Been Treated In The Past?: has not been treated detailed exam

## 2023-08-30 NOTE — BRIEF OPERATIVE NOTE - CONDITION POST OP
Stable Complex Repair And Double Advancement Flap Text: The defect edges were debeveled with a #15 scalpel blade.  The primary defect was closed partially with a complex linear closure.  Given the location of the remaining defect, shape of the defect and the proximity to free margins a double advancement flap was deemed most appropriate for complete closure of the defect.  Using a sterile surgical marker, an appropriate advancement flap was drawn incorporating the defect and placing the expected incisions within the relaxed skin tension lines where possible.    The area thus outlined was incised deep to adipose tissue with a #15 scalpel blade.  The skin margins were undermined to an appropriate distance in all directions utilizing iris scissors.

## 2024-12-16 NOTE — H&P PST ADULT - DOES PATIENT HAVE ADVANCE DIRECTIVE
Plan: Educated patient on the importance of daily sunscreen use. Recommended EltaMD, Blue Lizard, Cerave, or any OTC equivalent.
Render In Strict Bullet Format?: No
Detail Level: Zone
HCP form and education provided